# Patient Record
Sex: MALE | Race: BLACK OR AFRICAN AMERICAN | Employment: OTHER | URBAN - METROPOLITAN AREA
[De-identification: names, ages, dates, MRNs, and addresses within clinical notes are randomized per-mention and may not be internally consistent; named-entity substitution may affect disease eponyms.]

---

## 2017-01-10 ENCOUNTER — OFFICE VISIT (OUTPATIENT)
Dept: NEUROLOGY | Age: 77
End: 2017-01-10

## 2017-01-10 VITALS
BODY MASS INDEX: 24.21 KG/M2 | WEIGHT: 150 LBS | RESPIRATION RATE: 18 BRPM | DIASTOLIC BLOOD PRESSURE: 62 MMHG | HEART RATE: 98 BPM | SYSTOLIC BLOOD PRESSURE: 110 MMHG

## 2017-01-10 DIAGNOSIS — G30.1 LATE ONSET ALZHEIMER'S DISEASE WITHOUT BEHAVIORAL DISTURBANCE (HCC): Primary | ICD-10-CM

## 2017-01-10 DIAGNOSIS — I63.539 CEREBROVASCULAR ACCIDENT (CVA) DUE TO OCCLUSION OF POSTERIOR CEREBRAL ARTERY, UNSPECIFIED BLOOD VESSEL LATERALITY (HCC): ICD-10-CM

## 2017-01-10 DIAGNOSIS — F02.80 LATE ONSET ALZHEIMER'S DISEASE WITHOUT BEHAVIORAL DISTURBANCE (HCC): Primary | ICD-10-CM

## 2017-01-10 DIAGNOSIS — E53.8 B12 DEFICIENCY: ICD-10-CM

## 2017-01-10 RX ORDER — DONEPEZIL HYDROCHLORIDE 10 MG/1
10 TABLET, FILM COATED ORAL
Qty: 90 TAB | Refills: 1 | Status: SHIPPED | OUTPATIENT
Start: 2017-01-10 | End: 2017-07-11 | Stop reason: ALTCHOICE

## 2017-01-10 RX ORDER — ASPIRIN 81 MG/1
81 TABLET ORAL
COMMUNITY
End: 2021-05-16

## 2017-01-10 RX ORDER — ATORVASTATIN CALCIUM 40 MG/1
40 TABLET, FILM COATED ORAL DAILY
Qty: 90 TAB | Refills: 1 | Status: SHIPPED | OUTPATIENT
Start: 2017-01-10 | End: 2017-07-17 | Stop reason: SDUPTHER

## 2017-01-10 NOTE — LETTER
1/10/2017 1:56 PM 
 
Patient:  Rosie Shaffer YOB: 1940 Date of Visit: 1/10/2017 Dear Jin Huber NP 
[facility] [address] VIA Facsimile: [phone] 
 : Thank you for referring Mr. Rosie Shaffer to me for evaluation/treatment. Below are the relevant portions of my assessment and plan of care. If you have questions, please do not hesitate to call me. I look forward to following Mr. Jose Eduardo Goodwin along with you.  
 
 
 
Sincerely, 
 
 
Angeline Kehr, DO

## 2017-01-10 NOTE — PATIENT INSTRUCTIONS

## 2017-01-10 NOTE — MR AVS SNAPSHOT
Visit Information Date & Time Provider Department Dept. Phone Encounter #  
 1/10/2017  1:40 PM Dinesh Holloway, [address] [facility] at [address]  [id number] Follow-up Instructions Return in about 6 months (around 7/10/2017). Upcoming Health Maintenance Date Due DTaP/Tdap/Td series (1 - Tdap) 7/14/1962 ZOSTER VACCINE AGE 60> 7/14/2001 GLAUCOMA SCREENING Q2Y 7/14/2006 Pneumococcal 65+ Low/Medium Risk (1 of 2 - PCV13) 7/14/2006 MEDICARE YEARLY EXAM 7/14/2006 INFLUENZA AGE 9 TO ADULT 8/1/2016 Allergies as of 1/10/2017  Review Complete On: 1/10/2017 By: Dinesh Holloway, DO No Known Allergies Current Immunizations  Never Reviewed No immunizations on file. Not reviewed this visit You Were Diagnosed With   
  
 Codes Comments Late onset Alzheimer's disease without behavioral disturbance    -  Primary ICD-10-CM: G30.1, F02.80 ICD-9-CM: 331.0, 294.10   
 B12 deficiency     ICD-10-CM: E53.8 ICD-9-CM: 266.2 Cerebrovascular accident (CVA) due to occlusion of posterior cerebral artery, unspecified blood vessel laterality ([facility] [address]     ICD-10-CM: R07.136 ICD-9-CM: 434.91 Vitals BP Pulse Resp Weight(growth percentile) BMI Smoking Status 110/62 98 18 150 lb (68 kg) 24.21 kg/m2 Former Smoker BMI and BSA Data Body Mass Index Body Surface Area  
 24.21 kg/m 2 1.78 m 2 Preferred Pharmacy Pharmacy Name Phone [facility] [address] [place], [place] - [address] [place] [address] [phone] Your Updated Medication List  
  
   
This list is accurate as of: 1/10/17  1:53 PM.  Always use your most recent med list.  
  
  
  
  
 aspirin delayed-release 81 mg tablet Take  by mouth daily. atorvastatin 40 mg tablet Commonly known as:  LIPITOR Take 1 Tab by mouth daily. donepezil 10 mg tablet Commonly known as:  ARICEPT Take 1 Tab by mouth nightly. VITAMIN B-12 1,000 mcg tablet Generic drug:  cyanocobalamin Take 1,000 mcg by mouth daily. Prescriptions Sent to Pharmacy Refills  
 atorvastatin (LIPITOR) 40 mg tablet 1 Sig: Take 1 Tab by mouth daily. Class: Normal  
 Pharmacy: [address] [place] [address] Ph #: [phone] Route: Oral  
 donepezil (ARICEPT) 10 mg tablet 1 Sig: Take 1 Tab by mouth nightly. Class: Normal  
 Pharmacy: [address] [place] [address] Ph #: [phone] Route: Oral  
  
Follow-up Instructions Return in about 6 months (around 7/10/2017). Patient Instructions A Healthy Lifestyle: Care Instructions Your Care Instructions A healthy lifestyle can help you feel good, stay at a healthy weight, and have plenty of energy for both work and play. A healthy lifestyle is something you can share with your whole family. A healthy lifestyle also can lower your risk for serious health problems, such as high blood pressure, heart disease, and diabetes. You can follow a few steps listed below to improve your health and the health of your family. Follow-up care is a key part of your treatment and safety. Be sure to make and go to all appointments, and call your doctor if you are having problems. Its also a good idea to know your test results and keep a list of the medicines you take. How can you care for yourself at home? · Do not eat too much sugar, fat, or fast foods. You can still have dessert and treats now and then. The goal is moderation. · Start small to improve your eating habits. Pay attention to portion sizes, drink less juice and soda pop, and eat more fruits and vegetables. ¨ Eat a healthy amount of food. A 3-ounce serving of meat, for example, is about the size of a deck of cards. Fill the rest of your plate with vegetables and whole grains. ¨ Limit the amount of soda and sports drinks you have every day. Drink more water when you are thirsty. ¨ Eat at least 5 servings of fruits and vegetables every day. It may seem like a lot, but it is not hard to reach this goal. A serving or helping is 1 piece of fruit, 1 cup of vegetables, or 2 cups of leafy, raw vegetables. Have an apple or some carrot sticks as an afternoon snack instead of a candy bar. Try to have fruits and/or vegetables at every meal. 
· Make exercise part of your daily routine. You may want to start with simple activities, such as walking, bicycling, or slow swimming. Try to be active 30 to 60 minutes every day. You do not need to do all 30 to 60 minutes all at once. For example, you can exercise 3 times a day for 10 or 20 minutes. Moderate exercise is safe for most people, but it is always a good idea to talk to your doctor before starting an exercise program. 
· Keep moving. Kolbeatriz Mais the lawn, work in the garden, or Think Passenger. Take the stairs instead of the elevator at work. · If you smoke, quit. People who smoke have an increased risk for heart attack, stroke, cancer, and other lung illnesses. Quitting is hard, but there are ways to boost your chance of quitting tobacco for good. ¨ Use nicotine gum, patches, or lozenges. ¨ Ask your doctor about stop-smoking programs and medicines. ¨ Keep trying. In addition to reducing your risk of diseases in the future, you will notice some benefits soon after you stop using tobacco. If you have shortness of breath or asthma symptoms, they will likely get better within a few weeks after you quit. · Limit how much alcohol you drink. Moderate amounts of alcohol (up to 2 drinks a day for men, 1 drink a day for women) are okay. But drinking too much can lead to liver problems, high blood pressure, and other health problems. Family health If you have a family, there are many things you can do together to improve your health. · Eat meals together as a family as often as possible. · Eat healthy foods. This includes fruits, vegetables, lean meats and dairy, and whole grains. · Include your family in your fitness plan. Most people think of activities such as jogging or tennis as the way to fitness, but there are many ways you and your family can be more active. Anything that makes you breathe hard and gets your heart pumping is exercise. Here are some tips: 
¨ Walk to do errands or to take your child to school or the bus. ¨ Go for a family bike ride after dinner instead of watching TV. Where can you learn more? Go to http://jacintoRockwell Collinsray.info/. Enter F069 in the search box to learn more about \"A Healthy Lifestyle: Care Instructions. \" Current as of: July 26, 2016 Content Version: 11.1 © 1995-0889 [employer]. Care instructions adapted under license by [employer] (which disclaims liability or warranty for this information). If you have questions about a medical condition or this instruction, always ask your healthcare professional. [address] any warranty or liability for your use of this information. Please provide this summary of care documentation to your next provider. Your primary care clinician is listed as Krysta Branch. If you have any questions after today's visit, please call [phone].

## 2017-01-10 NOTE — PROGRESS NOTES
Neurology Clinic Follow up Note    Patient ID:  Ritika Colon  [id number]  77 y.o.  1940      Mr. Ashutosh Sargent is here for follow up today of  Chief Complaint   Patient presents with    Memory Loss          Last Appointment With Me:  10/6/16      Interval History:   Pt and family report cognitive deficits have been stable since last visit. Questioning why he has to take medications at times. No behavioral outburst, agitation. Independent of all ADLs except medication administration. He is still driving and denies getting lost.  No MVAs. Still living at home with his wife. He is still sleeping well. Lost 3 lbs since last visit. Eating all meals appropriately. Denies side effects from Aricept. Taking B12 1000mcg daily due to deficiency noted on labs. Taking ASA/Lipitor for stroke prevention. PMHx/ PSHx/ FHx/ SHx:  Reviewed and unchanged previous visit. ROS:  Comprehensive review of systems negative except for as noted above. Objective:       Meds:  Current Outpatient Prescriptions   Medication Sig Dispense Refill    aspirin delayed-release 81 mg tablet Take  by mouth daily.  atorvastatin (LIPITOR) 40 mg tablet Take 1 Tab by mouth daily. 30 Tab 2    cyanocobalamin (VITAMIN B-12) 1,000 mcg tablet Take 1,000 mcg by mouth daily.  donepezil (ARICEPT) 10 mg tablet Take 1 Tab by mouth nightly. 30 Tab 2        Visit Vitals    /62    Pulse 98    Resp 18    Wt 68 kg (150 lb)    BMI 24.21 kg/m2       LABS  Results for orders placed or performed in visit on 10/06/16   LIPID PANEL   Result Value Ref Range    Cholesterol, total 182 100 - 199 mg/dL    Triglyceride 121 0 - 149 mg/dL    HDL Cholesterol 43 >39 mg/dL    VLDL, calculated 24 5 - 40 mg/dL    LDL, calculated 115 (H) 0 - 99 mg/dL       IMAGING:  MRI Results (most recent):    Results from Hospital Encounter encounter on 07/14/16   MRI BRAIN WO CONT   Narrative **Final Report**      ICD Codes / Adm. Diagnosis: 331.0 294.10 / Alzheimer's disease  Dementia in   conditions classif  Examination:  MR BRAIN WO CON  - [id number] - Jul 14 2016  6:09PM  Accession No:  [id number]  Reason:  dementia      REPORT:  EXAM:  MR BRAIN WO CON    INDICATION:  Progressing short-term memory loss over the past 8 months. Tobacco use. COMPARISON: None. TECHNIQUE: Sagittal T1; coronal T2; axial T1, T2, FLAIR, gradient-echo, and   diffusion weighted noncontrast MRI of the brain. FINDINGS: Moderate cerebral volume loss for age. No hydrocephalus. No mass   effect or midline shift. No extra-axial fluid collection. Volume loss and hyperintense T2 signal in the left occipital lobe indicates   old infarct. There is ex vacuo dilatation of the occipital horn of the left   lateral ventricle. Moderate volume of hyperintense T2/FLAIR signal in the cerebral white matter   is predominantly periventricular in the frontal and parietal lobes. No   restricted diffusion to indicate acute infarct. No blood flow in the left vertebral artery. Right vertebral artery and   basilar artery flow voids are patent. Anterior circulation flow-voids are   patent. Medial temporal lobes are symmetric. The midline structures, including the   cervicomedullary junction, are within normal limits. IMPRESSION:    1. Volume loss and chronic microvascular ischemic disease. 2. Old left occipital infarct. 3. No acute infarct or mass effect. 4. Evidence of left vertebral artery occlusion. Signing/Reading Doctor: Ailyn Kent ([id number])    Approved: Ailyn Kent ([id number])  Jul 15 2016  8:58AM                                          Assessment:     Encounter Diagnoses     ICD-10-CM ICD-9-CM   1. Late onset Alzheimer's disease without behavioral disturbance G30.1 331.0    F02.80 294.10     76 yo AAM presenting for f/u regarding slowly progressive cognitive impairment. [address] [place] [address] 14/30 c/w moderate dementia.  MRI Brain completed and demonstrates moderate burden of microvascular ischemia, remote L occipital infarct and mod to severe global atrophy prominent over the R>L temporal lobes. 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 In addition, laboratory investigations for underlying metabolic derangements revealed B12 deficiency. He appears to be responding well to Aricept. Plan:   Cont. ASA 81mg, Lipitor for stroke prevention  Cont. Aricept 10mg for moderate dementia  Cont. B12 supplementation  Heart healthy diet and exercise  Regular scheduled cognitive and social engagement.   F/U 6 months    Signed:  Daniel Hughes DO  1/10/2017

## 2017-07-11 ENCOUNTER — OFFICE VISIT (OUTPATIENT)
Dept: NEUROLOGY | Age: 77
End: 2017-07-11

## 2017-07-11 VITALS
DIASTOLIC BLOOD PRESSURE: 58 MMHG | BODY MASS INDEX: 22.27 KG/M2 | RESPIRATION RATE: 18 BRPM | WEIGHT: 138 LBS | HEART RATE: 69 BPM | OXYGEN SATURATION: 98 % | SYSTOLIC BLOOD PRESSURE: 128 MMHG

## 2017-07-11 DIAGNOSIS — I63.9 CEREBROVASCULAR ACCIDENT (CVA), UNSPECIFIED MECHANISM (HCC): ICD-10-CM

## 2017-07-11 DIAGNOSIS — G30.1 LATE ONSET ALZHEIMER'S DISEASE WITHOUT BEHAVIORAL DISTURBANCE (HCC): Primary | ICD-10-CM

## 2017-07-11 DIAGNOSIS — F02.80 LATE ONSET ALZHEIMER'S DISEASE WITHOUT BEHAVIORAL DISTURBANCE (HCC): Primary | ICD-10-CM

## 2017-07-11 DIAGNOSIS — E53.8 B12 DEFICIENCY: ICD-10-CM

## 2017-07-11 NOTE — LETTER
7/11/2017 3:56 PM 
 
Patient:  Jasper Rizo YOB: 1940 Date of Visit: 7/11/2017 Dear Corrinne Haus, SOBEIDA 
[address] [address] [address] VIA Facsimile: [phone] 
 : 
 
 
I recently had the pleasure of seeing  Mr. Jasper Rizo  for evaluation/treatment. Below are the relevant portions of my assessment and plan of care. If you have questions, please do not hesitate to call me. I look forward to following Mr. Berry Lanes along with you.  
 
 
 
Sincerely, 
 
 
Alex Breauxh, DO

## 2017-07-11 NOTE — PROGRESS NOTES
Neurology Clinic Follow up Note    Patient ID:  Adrianna Jarrell  [id number]  77 y.o.  1940      Mr. Lin Culp is here for follow up today of dementia       Last Appointment With Me:  1/10/17      Interval History:   Pt and family report cognitive deficits have progressed since last visit. More repetition at times per his daughter. He is uncooperative per his wife and quickly agitated. Independent of all ADLs except medication administration. He is still driving and denies getting lost.  No MVAs. Still living at home with his wife. He is still sleeping well. Skipping lunch daily and continuing to lose weight. Denies side effects from Aricept. Taking B12 1000mcg daily due to deficiency noted on labs. Taking ASA/Lipitor for stroke prevention. PMHx/ PSHx/ FHx/ SHx:  Reviewed and unchanged previous visit. ROS:  Comprehensive review of systems negative except for as noted above. Objective:       Meds:  Current Outpatient Prescriptions   Medication Sig Dispense Refill    aspirin delayed-release 81 mg tablet Take  by mouth daily.  atorvastatin (LIPITOR) 40 mg tablet Take 1 Tab by mouth daily. 90 Tab 1    donepezil (ARICEPT) 10 mg tablet Take 1 Tab by mouth nightly. 90 Tab 1    cyanocobalamin (VITAMIN B-12) 1,000 mcg tablet Take 1,000 mcg by mouth daily. Visit Vitals    /58    Pulse 69    Resp 18    Wt 62.6 kg (138 lb)    SpO2 98%    BMI 22.27 kg/m2       LABS  Results for orders placed or performed in visit on 10/06/16   LIPID PANEL   Result Value Ref Range    Cholesterol, total 182 100 - 199 mg/dL    Triglyceride 121 0 - 149 mg/dL    HDL Cholesterol 43 >39 mg/dL    VLDL, calculated 24 5 - 40 mg/dL    LDL, calculated 115 (H) 0 - 99 mg/dL       IMAGING:  MRI Results (most recent):    Results from Hospital Encounter encounter on 07/14/16   MRI BRAIN WO CONT   Narrative **Final Report**      ICD Codes / Adm. Diagnosis: 331.0  294.10 / Alzheimer's disease  Dementia in   conditions classif  Examination:  MR BRAIN WO CON  - [id number] - Jul 14 2016  6:09PM  Accession No:  [id number]  Reason:  dementia      REPORT:  EXAM:  MR BRAIN WO CON    INDICATION:  Progressing short-term memory loss over the past 8 months. Tobacco use. COMPARISON: None. TECHNIQUE: Sagittal T1; coronal T2; axial T1, T2, FLAIR, gradient-echo, and   diffusion weighted noncontrast MRI of the brain. FINDINGS: Moderate cerebral volume loss for age. No hydrocephalus. No mass   effect or midline shift. No extra-axial fluid collection. Volume loss and hyperintense T2 signal in the left occipital lobe indicates   old infarct. There is ex vacuo dilatation of the occipital horn of the left   lateral ventricle. Moderate volume of hyperintense T2/FLAIR signal in the cerebral white matter   is predominantly periventricular in the frontal and parietal lobes. No   restricted diffusion to indicate acute infarct. No blood flow in the left vertebral artery. Right vertebral artery and   basilar artery flow voids are patent. Anterior circulation flow-voids are   patent. Medial temporal lobes are symmetric. The midline structures, including the   cervicomedullary junction, are within normal limits. IMPRESSION:    1. Volume loss and chronic microvascular ischemic disease. 2. Old left occipital infarct. 3. No acute infarct or mass effect. 4. Evidence of left vertebral artery occlusion. Signing/Reading Doctor: Lia Carson ([id number])    Approved: Lia Carson ([id number])  Jul 15 2016  8:58AM                                          Assessment:     Encounter Diagnoses     ICD-10-CM ICD-9-CM   1. Late onset Alzheimer's disease without behavioral disturbance G30.1 331.0    F02.80 294.10   2. B12 deficiency E53.8 266.2   3. Cerebrovascular accident (CVA), unspecified mechanism ([facility] [address] I63.9 434.91     78 yo AAM presenting for f/u regarding slowly progressive cognitive impairment.   [address] [place] [address] 14/30 c/w moderate dementia. MRI Brain completed and demonstrates moderate burden of microvascular ischemia, remote L occipital infarct and mod to severe global atrophy prominent over the R>L temporal lobes. 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 In addition, laboratory investigations for underlying metabolic derangements revealed B12 deficiency. Family reporting some slow decline in cognition and mild agitation at times. Discussed trial of Namzaric (combination Aricept/namenda) which may further assist with his cognitive deficits. He continues to lose weight- encouraged meals TID and supplementation with Ensure to increase caloric intake. Plan:   Cont. ASA 81mg, Lipitor for stroke prevention  D/C Aricept- start Namzaric titration pack   Cont. B12 supplementation  Heart healthy diet and exercise  Regular scheduled cognitive and social engagement.   F/U 6 months    Signed:  Lenin oRgers DO  7/11/2017

## 2017-07-11 NOTE — MR AVS SNAPSHOT
Visit Information Date & Time Provider Department Dept. Phone Encounter #  
 7/11/2017  2:40 PM Georgi Jain, [facility] at [address] [id number] Follow-up Instructions Return in about 6 months (around 1/11/2018). Upcoming Health Maintenance Date Due DTaP/Tdap/Td series (1 - Tdap) 7/14/1962 ZOSTER VACCINE AGE 60> 7/14/2001 GLAUCOMA SCREENING Q2Y 7/14/2006 Pneumococcal 65+ Low/Medium Risk (1 of 2 - PCV13) 7/14/2006 MEDICARE YEARLY EXAM 7/14/2006 INFLUENZA AGE 9 TO ADULT 8/1/2017 Allergies as of 7/11/2017  Review Complete On: 7/11/2017 By: Georgi Jain, DO No Known Allergies Current Immunizations  Never Reviewed No immunizations on file. Not reviewed this visit You Were Diagnosed With   
  
 Codes Comments Late onset Alzheimer's disease without behavioral disturbance    -  Primary ICD-10-CM: G30.1, F02.80 ICD-9-CM: 331.0, 294.10   
 B12 deficiency     ICD-10-CM: E53.8 ICD-9-CM: 266.2 Cerebrovascular accident (CVA), unspecified mechanism ([facility] [address]     ICD-10-CM: I63.9 ICD-9-CM: 434.91 Vitals BP Pulse Resp Weight(growth percentile) SpO2 BMI  
 128/58 69 18 138 lb (62.6 kg) 98% 22.27 kg/m2 Smoking Status Former Smoker Vitals History BMI and BSA Data Body Mass Index Body Surface Area  
 22.27 kg/m 2 1.71 m 2 Preferred Pharmacy Pharmacy Name Phone [facility] [address] [place], [place] - [address] [place] [address] [phone] Your Updated Medication List  
  
   
This list is accurate as of: 7/11/17  3:12 PM.  Always use your most recent med list.  
  
  
  
  
 aspirin delayed-release 81 mg tablet Take  by mouth daily. atorvastatin 40 mg tablet Commonly known as:  LIPITOR Take 1 Tab by mouth daily. * memantine-donepezil 28-10 mg Cspx Commonly known as:  [employer] [facility] Take 28 mg by mouth daily. * memantine-donepezil 7/14/21/28 mg-10 mg C24k Commonly known as:  [facility] Take 7 mg by mouth daily. VITAMIN B-12 1,000 mcg tablet Generic drug:  cyanocobalamin Take 1,000 mcg by mouth daily. * Notice: This list has 2 medication(s) that are the same as other medications prescribed for you. Read the directions carefully, and ask your doctor or other care provider to review them with you. Prescriptions Sent to Pharmacy Refills  
 memantine-donepezil (NAMZARIC) 28-10 mg CSpX 2 Sig: Take 28 mg by mouth daily. Class: Normal  
 Pharmacy: [address] [place] [address] Road [address] [place] [address], [address] [place] [address] Ph #: [phone] Route: Oral  
  
Follow-up Instructions Return in about 6 months (around 1/11/2018). Introducing [facility] SERVICES! Modesto Zhang introduces [employer] patient portal. Now you can access parts of your medical record, email your doctor's office, and request medication refills online. 1. In your internet browser, go to [email or web address] 2. Click on the First Time User? Click Here link in the Sign In box. You will see the New Member Sign Up page. 3. Enter your [employer] Access Code exactly as it appears below. You will not need to use this code after youve completed the sign-up process. If you do not sign up before the expiration date, you must request a new code. · [employer] Access Code: [email or web address] Expires: 10/9/2017  2:04 PM 
 
4. Enter the last four digits of your Social Security Number (xxxx) and Date of Birth (mm/dd/yyyy) as indicated and click Submit. You will be taken to the next sign-up page. 5. Create a [employer] ID. This will be your [employer] login ID and cannot be changed, so think of one that is secure and easy to remember. 6. Create a [employer] password. You can change your password at any time. 7. Enter your Password Reset Question and Answer. This can be used at a later time if you forget your password. 8. Enter your e-mail address. You will receive e-mail notification when new information is available in [address] 9. Click Sign Up. You can now view and download portions of your medical record. 10. Click the Download Summary menu link to download a portable copy of your medical information. If you have questions, please visit the Frequently Asked Questions section of the [employer] website. Remember, [employer] is NOT to be used for urgent needs. For medical emergencies, dial 911. Now available from your iPhone and Android! Please provide this summary of care documentation to your next provider. Your primary care clinician is listed as Krysta Garcia. If you have any questions after today's visit, please call [phone].

## 2017-08-02 ENCOUNTER — TELEPHONE (OUTPATIENT)
Dept: NEUROLOGY | Age: 77
End: 2017-08-02

## 2017-08-02 NOTE — TELEPHONE ENCOUNTER
Pt was prescribed new medication at his last visit. Was unable to do the medication because of the cost, wondering if he can go back to the old medication and would also need a refill. Requesting a phone call from the nurse to clarify.

## 2017-08-03 RX ORDER — MEMANTINE HYDROCHLORIDE 5 MG/1
5 TABLET ORAL DAILY
Qty: 30 TAB | Refills: 2 | Status: SHIPPED | OUTPATIENT
Start: 2017-08-03 | End: 2017-11-01 | Stop reason: SDUPTHER

## 2017-08-03 RX ORDER — DONEPEZIL HYDROCHLORIDE 10 MG/1
10 TABLET, FILM COATED ORAL
Qty: 30 TAB | Refills: 2 | Status: SHIPPED | OUTPATIENT
Start: 2017-08-03 | End: 2017-11-07 | Stop reason: SDUPTHER

## 2017-08-03 NOTE — TELEPHONE ENCOUNTER
Spoke with daughter, informed her of new orders for Aricept and Namenda per  verbal order. She verbalized understanding.

## 2018-01-18 RX ORDER — ATORVASTATIN CALCIUM 40 MG/1
TABLET, FILM COATED ORAL
Qty: 90 TAB | Refills: 0 | Status: SHIPPED | OUTPATIENT
Start: 2018-01-18 | End: 2018-04-16 | Stop reason: SDUPTHER

## 2018-01-29 ENCOUNTER — OFFICE VISIT (OUTPATIENT)
Dept: NEUROLOGY | Age: 78
End: 2018-01-29

## 2018-01-29 VITALS — DIASTOLIC BLOOD PRESSURE: 62 MMHG | SYSTOLIC BLOOD PRESSURE: 134 MMHG | WEIGHT: 135 LBS | BODY MASS INDEX: 21.79 KG/M2

## 2018-01-29 DIAGNOSIS — G30.1 LATE ONSET ALZHEIMER'S DISEASE WITHOUT BEHAVIORAL DISTURBANCE (HCC): Primary | ICD-10-CM

## 2018-01-29 DIAGNOSIS — F02.80 LATE ONSET ALZHEIMER'S DISEASE WITHOUT BEHAVIORAL DISTURBANCE (HCC): Primary | ICD-10-CM

## 2018-01-29 DIAGNOSIS — Z86.73 REMOTE HISTORY OF STROKE: ICD-10-CM

## 2018-01-29 DIAGNOSIS — E53.8 B12 DEFICIENCY: ICD-10-CM

## 2018-01-29 RX ORDER — MEMANTINE HYDROCHLORIDE 10 MG/1
10 TABLET ORAL DAILY
Qty: 90 TAB | Refills: 1 | Status: SHIPPED | OUTPATIENT
Start: 2018-01-29 | End: 2018-05-09 | Stop reason: SDUPTHER

## 2018-01-29 RX ORDER — DONEPEZIL HYDROCHLORIDE 10 MG/1
10 TABLET, FILM COATED ORAL
Qty: 90 TAB | Refills: 1 | Status: SHIPPED | OUTPATIENT
Start: 2018-01-29 | End: 2018-05-09

## 2018-01-29 NOTE — MR AVS SNAPSHOT
[address] 
[phone] Patient: Karon Guerrero MRN: [id number] Visit Information Date & Time Provider Department Dept. Phone Encounter #  
 1/29/2018  2:40 PM Irma Correia, [address] [facility] [place] [id number] Follow-up Instructions Return in about 6 months (around 7/29/2018). Upcoming Health Maintenance Date Due DTaP/Tdap/Td series (1 - Tdap) 7/14/1962 ZOSTER VACCINE AGE 60> 5/14/2001 GLAUCOMA SCREENING Q2Y 7/14/2006 Pneumococcal 65+ Low/Medium Risk (1 of 2 - PCV13) 7/14/2006 MEDICARE YEARLY EXAM 7/14/2006 Influenza Age 5 to Adult 8/1/2017 Allergies as of 1/29/2018  Review Complete On: 1/29/2018 By: Irma Correia, DO No Known Allergies Current Immunizations  Never Reviewed No immunizations on file. Not reviewed this visit You Were Diagnosed With   
  
 Codes Comments Late onset Alzheimer's disease without behavioral disturbance    -  Primary ICD-10-CM: G30.1, F02.80 ICD-9-CM: 331.0, 294.10   
 B12 deficiency     ICD-10-CM: E53.8 ICD-9-CM: 266.2 Remote history of stroke     ICD-10-CM: Z86.73 
ICD-9-CM: V12.54 Vitals BP Weight(growth percentile) BMI Smoking Status 134/62 135 lb (61.2 kg) 21.79 kg/m2 Former Smoker BMI and BSA Data Body Mass Index Body Surface Area 21.79 kg/m 2 1.69 m 2 Preferred Pharmacy Pharmacy Name Phone [facility] [address] [place] [address] Osbaldo Gary [phone] Your Updated Medication List  
  
   
This list is accurate as of: 1/29/18  3:01 PM.  Always use your most recent med list.  
  
  
  
  
 aspirin delayed-release 81 mg tablet Take  by mouth daily. atorvastatin 40 mg tablet Commonly known as:  LIPITOR  
TAKE ONE TABLET BY MOUTH ONCE DAILY * donepezil 10 mg tablet Commonly known as:  ARICEPT Take 1 Tab by mouth nightly. * donepezil 10 mg tablet Commonly known as:  ARICEPT Take 1 Tab by mouth nightly. memantine 10 mg tablet Commonly known as:  Kai Landaverde Take 1 Tab by mouth daily. * memantine-donepezil 28-10 mg Cspx Commonly known as:  [facility] Take 28 mg by mouth daily. * memantine-donepezil 7/14/21/28 mg-10 mg C24k Commonly known as:  [facility] Take 7 mg by mouth daily. VITAMIN B-12 1,000 mcg tablet Generic drug:  cyanocobalamin Take 1,000 mcg by mouth daily. * Notice: This list has 4 medication(s) that are the same as other medications prescribed for you. Read the directions carefully, and ask your doctor or other care provider to review them with you. Prescriptions Sent to Pharmacy Refills  
 memantine (NAMENDA) 10 mg tablet 1 Sig: Take 1 Tab by mouth daily. Class: Normal  
 [facility] [address] [place] [address] Ph #: [phone] Route: Oral  
 donepezil (ARICEPT) 10 mg tablet 1 Sig: Take 1 Tab by mouth nightly. Class: Normal  
 Pharmacy: [facility] JOSIE CONTRERAS [address] [place] [address] Ph #: [phone] Route: Oral  
  
Follow-up Instructions Return in about 6 months (around 7/29/2018). Patient Instructions A Healthy Lifestyle: Care Instructions Your Care Instructions A healthy lifestyle can help you feel good, stay at a healthy weight, and have plenty of energy for both work and play. A healthy lifestyle is something you can share with your whole family. A healthy lifestyle also can lower your risk for serious health problems, such as high blood pressure, heart disease, and diabetes. You can follow a few steps listed below to improve your health and the health of your family. Follow-up care is a key part of your treatment and safety.  Be sure to make and go to all appointments, and call your doctor if you are having problems. It's also a good idea to know your test results and keep a list of the medicines you take. How can you care for yourself at home? · Do not eat too much sugar, fat, or fast foods. You can still have dessert and treats now and then. The goal is moderation. · Start small to improve your eating habits. Pay attention to portion sizes, drink less juice and soda pop, and eat more fruits and vegetables. ¨ Eat a healthy amount of food. A 3-ounce serving of meat, for example, is about the size of a deck of cards. Fill the rest of your plate with vegetables and whole grains. ¨ Limit the amount of soda and sports drinks you have every day. Drink more water when you are thirsty. ¨ Eat at least 5 servings of fruits and vegetables every day. It may seem like a lot, but it is not hard to reach this goal. A serving or helping is 1 piece of fruit, 1 cup of vegetables, or 2 cups of leafy, raw vegetables. Have an apple or some carrot sticks as an afternoon snack instead of a candy bar. Try to have fruits and/or vegetables at every meal. 
· Make exercise part of your daily routine. You may want to start with simple activities, such as walking, bicycling, or slow swimming. Try to be active 30 to 60 minutes every day. You do not need to do all 30 to 60 minutes all at once. For example, you can exercise 3 times a day for 10 or 20 minutes. Moderate exercise is safe for most people, but it is always a good idea to talk to your doctor before starting an exercise program. 
· Keep moving. Jackelin Harpin the lawn, work in the garden, or Solaborate. Take the stairs instead of the elevator at work. · If you smoke, quit. People who smoke have an increased risk for heart attack, stroke, cancer, and other lung illnesses. Quitting is hard, but there are ways to boost your chance of quitting tobacco for good. ¨ Use nicotine gum, patches, or lozenges. ¨ Ask your doctor about stop-smoking programs and medicines. ¨ Keep trying. In addition to reducing your risk of diseases in the future, you will notice some benefits soon after you stop using tobacco. If you have shortness of breath or asthma symptoms, they will likely get better within a few weeks after you quit. · Limit how much alcohol you drink. Moderate amounts of alcohol (up to 2 drinks a day for men, 1 drink a day for women) are okay. But drinking too much can lead to liver problems, high blood pressure, and other health problems. Family health If you have a family, there are many things you can do together to improve your health. · Eat meals together as a family as often as possible. · Eat healthy foods. This includes fruits, vegetables, lean meats and dairy, and whole grains. · Include your family in your fitness plan. Most people think of activities such as jogging or tennis as the way to fitness, but there are many ways you and your family can be more active. Anything that makes you breathe hard and gets your heart pumping is exercise. Here are some tips: 
¨ Walk to do errands or to take your child to school or the bus. ¨ Go for a family bike ride after dinner instead of watching TV. Where can you learn more? Go to http://jacinto-ray.info/. Enter Q496 in the search box to learn more about \"A Healthy Lifestyle: Care Instructions. \" Current as of: May 12, 2017 Content Version: 11.4 © 5437-3884 [employer]. Care instructions adapted under license by [employer] (which disclaims liability or warranty for this information). If you have questions about a medical condition or this instruction, always ask your healthcare professional. [address] any warranty or liability for your use of this information. Introducing [facility]!    
 Sam Julien introduces [employer] patient portal. Now you can access parts of your medical record, email your doctor's office, and request medication refills online. 1. In your internet browser, go to [email or web address] "[email or web address]/[email or web address] 2. Click on the First Time User? Click Here link in the Sign In box. You will see the New Member Sign Up page. 3. Enter your Unight Access Code exactly as it appears below. You will not need to use this code after youve completed the sign-up process. If you do not sign up before the expiration date, you must request a new code. · Unight Access Code: [email or web address] Expires: 4/29/2018  2:33 PM 
 
4. Enter the last four digits of your Social Security Number (xxxx) and Date of Birth (mm/dd/yyyy) as indicated and click Submit. You will be taken to the next sign-up page. 5. Create a Unight ID. This will be your Unight login ID and cannot be changed, so think of one that is secure and easy to remember. 6. Create a Unight password. You can change your password at any time. 7. Enter your Password Reset Question and Answer. This can be used at a later time if you forget your password. 8. Enter your e-mail address. You will receive e-mail notification when new information is available in [address] 9. Click Sign Up. You can now view and download portions of your medical record. 10. Click the Download Summary menu link to download a portable copy of your medical information. If you have questions, please visit the Frequently Asked Questions section of the [employer]ight website. Remember, Unight is NOT to be used for urgent needs. For medical emergencies, dial 911. Now available from your iPhone and Android! Please provide this summary of care documentation to your next provider. Your primary care clinician is listed as Viola Adam. If you have any questions after today's visit, please call [phone].

## 2018-01-29 NOTE — PATIENT INSTRUCTIONS

## 2018-01-29 NOTE — PROGRESS NOTES
Neurology Clinic Follow up Note    Patient ID:  Mortimer Parma  [id number]  6 y.o.  1940      Mr. Kanu Marc is here for follow up today of dementia       Last Appointment With Me:  7/11/17      Interval History:   Cognitive deficits have progressed since last visit. More agitated/upset at times. Non-cooperative on occasion. Independent of all ADLs except medication administration. He is still driving-No MVAs but new difficulty with navigation since last visit. Still living at home with his wife. He is still sleeping well. Skipping lunch daily and continuing to lose weight. Refusing supplements. Denies side effects from Aricept/Namenda. Not able to afford Namzaric. Taking B12 1000mcg daily due to deficiency noted on labs. Taking ASA/Lipitor for stroke prevention. No new stroke s/s. PMHx/ PSHx/ FHx/ SHx:  Reviewed and unchanged previous visit. Past Medical History:   Diagnosis Date    Memory loss     Poor appetite          ROS:  Comprehensive review of systems negative except for as noted above. Objective:       Meds:  Current Outpatient Prescriptions   Medication Sig Dispense Refill    atorvastatin (LIPITOR) 40 mg tablet TAKE ONE TABLET BY MOUTH ONCE DAILY 90 Tab 0    memantine (NAMENDA) 5 mg tablet TAKE ONE TABLET BY MOUTH ONCE DAILY 30 Tab 2    donepezil (ARICEPT) 10 mg tablet Take 1 Tab by mouth nightly. 30 Tab 2    memantine (NAMENDA) 5 mg tablet Take 1 Tab by mouth daily. 90 Tab 0    donepezil (ARICEPT) 10 mg tablet Take 1 Tab by mouth nightly. 90 Tab 0    memantine-donepezil (NAMZARIC) 28-10 mg CSpX Take 28 mg by mouth daily. 30 Cap 2    memantine-donepezil \[facility]\"") 7/14/21/28 mg-10 mg C24k Take 7 mg by mouth daily. 28 Cap 0    aspirin delayed-release 81 mg tablet Take  by mouth daily.  cyanocobalamin (VITAMIN B-12) 1,000 mcg tablet Take 1,000 mcg by mouth daily.           Visit Vitals    /62    Wt 61.2 kg (135 lb)    BMI 21.79 kg/m2   NEUROLOGICAL EXAM:  General: Awake, alert, speech fluent  CN: PERRL, EOMI without nystagmus, VFF to confrontation, facial sensation and strength are normal and symmetric, hearing is intact to finger rub bilaterally, palate and tongue movements are intact and symmetric. Motor: Normal tone, bulk and strength bilaterally. Reflexes: 1/4 and symmetric, plantar stimulation is flexor. Coordination: FNF, HEATHER, HTS intact. Sensation: LT intact throughout. Gait: Normal based      LABS  Results for orders placed or performed in visit on 10/06/16   LIPID PANEL   Result Value Ref Range    Cholesterol, total 182 100 - 199 mg/dL    Triglyceride 121 0 - 149 mg/dL    HDL Cholesterol 43 >39 mg/dL    VLDL, calculated 24 5 - 40 mg/dL    LDL, calculated 115 (H) 0 - 99 mg/dL       IMAGING:  MRI Results (most recent):    Results from Hospital Encounter encounter on 07/14/16   MRI BRAIN WO CONT   Narrative **Final Report**      ICD Codes / Adm. Diagnosis: 331.0  294.10 / Alzheimer's disease  Dementia in   conditions classif  Examination:  MR BRAIN WO CON  - [id number] - Jul 14 2016  6:09PM  Accession No:  [id number]  Reason:  dementia      REPORT:  EXAM:  MR BRAIN WO CON    INDICATION:  Progressing short-term memory loss over the past 8 months. Tobacco use. COMPARISON: None. TECHNIQUE: Sagittal T1; coronal T2; axial T1, T2, FLAIR, gradient-echo, and   diffusion weighted noncontrast MRI of the brain. FINDINGS: Moderate cerebral volume loss for age. No hydrocephalus. No mass   effect or midline shift. No extra-axial fluid collection. Volume loss and hyperintense T2 signal in the left occipital lobe indicates   old infarct. There is ex vacuo dilatation of the occipital horn of the left   lateral ventricle. Moderate volume of hyperintense T2/FLAIR signal in the cerebral white matter   is predominantly periventricular in the frontal and parietal lobes. No   restricted diffusion to indicate acute infarct.      No blood flow in the left vertebral artery. Right vertebral artery and   basilar artery flow voids are patent. Anterior circulation flow-voids are   patent. Medial temporal lobes are symmetric. The midline structures, including the   cervicomedullary junction, are within normal limits. IMPRESSION:    1. Volume loss and chronic microvascular ischemic disease. 2. Old left occipital infarct. 3. No acute infarct or mass effect. 4. Evidence of left vertebral artery occlusion. Signing/Reading Doctor: Jaret Lerma ([id number])    Approved: Jaret Lerma ([id number])  Jul 15 2016  8:58AM                                          Assessment:     Encounter Diagnoses     ICD-10-CM ICD-9-CM   1. Late onset Alzheimer's disease without behavioral disturbance G30.1 331.0    F02.80 294.10   2. B12 deficiency E53.8 266.2   3. Remote history of stroke Z80.78 V16.53     68 yo AAM presenting for f/u regarding slowly progressive cognitive impairment. [facility] 14/30 c/w moderate dementia. MRI Brain completed and demonstrates moderate burden of microvascular ischemia, remote L occipital infarct and mod to severe global atrophy prominent over the R>L temporal lobes. 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 In addition, laboratory investigations for underlying metabolic derangements revealed B12 deficiency. Family reporting ongoing slow decline in cognition and mild agitation at times. Discussed trial of Namzaric (combination Aricept/namenda) which may further assist with his cognitive deficits, however this was too expensive. Will titrate Namenda and continue Aricept at current dosing. Weight loss is a continued concern. He is refusing meal supplements. We discussed obtaining a home health aid to ensure he is eating appropriately in the daytime when his wife is working. The family will look into how to best handle this. Also discussed cessation of driving given difficulties with navigation and concerns for safety.     Plan: Continue Aricept 10mg/d, increase Namenda to 10mg/d  Cont. ASA 81mg, Lipitor for stroke prevention  Cont. B12 supplementation  Advised to stop driving  Recommend adult day care vs home health aid to assist in the AM when wife is unavailable  Heart healthy diet and exercise  Regular scheduled cognitive and social engagement. F/U 6 months    Signed:  Wojciech Chanel, DO  1/29/2018    The duration of this appointment visit was 25 minutes of face-to-face time with the patient. At least 50% of this time was spent in counseling, explanation of diagnosis, planning of further management, and coordination of care.

## 2018-02-26 ENCOUNTER — TELEPHONE (OUTPATIENT)
Dept: NEUROLOGY | Age: 78
End: 2018-02-26

## 2018-02-26 DIAGNOSIS — R45.1 AGITATION: Primary | ICD-10-CM

## 2018-02-26 RX ORDER — LORAZEPAM 0.5 MG/1
TABLET ORAL
Qty: 30 TAB | Refills: 0 | Status: SHIPPED | OUTPATIENT
Start: 2018-02-26 | End: 2018-04-25 | Stop reason: SDUPTHER

## 2018-02-26 NOTE — TELEPHONE ENCOUNTER
Spoke with daughter, informed her  was okay with her reducing Namenda to 5mg daily for patient.  She wanted to know if there was anything to help calm him down, he's been very excitable with his increased confusion

## 2018-02-26 NOTE — TELEPHONE ENCOUNTER
Pt's daughter calling to know if the Jane Antis can be reduced back to 5mg because there has been an increase in confusion since the medication has been increased. . Please call back

## 2018-03-13 NOTE — TELEPHONE ENCOUNTER
Pt's wife calling because the pt has a headache above his right eye and she would like to know what he can take. Please call back.

## 2018-03-13 NOTE — TELEPHONE ENCOUNTER
Pt wife calling back, requesting a returned phone call to see which medication that pt has been prescribed can he take, please try both numbers.  [phone] cell or home [phone]

## 2018-03-14 NOTE — TELEPHONE ENCOUNTER
Spoke with wife, she said he had an acute headache yesterday but on the way to ED he said he felt better so they never went. She states she'll call back if it happens again.

## 2018-04-23 ENCOUNTER — TELEPHONE (OUTPATIENT)
Dept: NEUROLOGY | Age: 78
End: 2018-04-23

## 2018-04-23 NOTE — TELEPHONE ENCOUNTER
Pt's daughter calling because the pt is not sleeping and she is wondering if Dr. Tracy Bansal can prescribe him something to help hi sleep.  Please call back

## 2018-04-23 NOTE — TELEPHONE ENCOUNTER
Spoke with daughter, she states patient has been hallucinating and talking to people who have passed away. She has tried giving him the PRN Ativan but it doesn't help with sleep. She wants to know if anything else can be done.

## 2018-04-24 NOTE — TELEPHONE ENCOUNTER
Called Ms. Hannah Hernandez to inform her  would like to see patient in follow up. No answer/VM full.

## 2018-04-26 ENCOUNTER — TELEPHONE (OUTPATIENT)
Dept: NEUROLOGY | Age: 78
End: 2018-04-26

## 2018-04-26 NOTE — TELEPHONE ENCOUNTER
Pt's daughter calling because the pt needs a refill on one of his medication, she was unsure what the name of the medication was. She is wondering if he can get a refill or if he needs to come in for an appt.  Please call back

## 2018-05-01 ENCOUNTER — TELEPHONE (OUTPATIENT)
Dept: NEUROLOGY | Age: 78
End: 2018-05-01

## 2018-05-09 ENCOUNTER — OFFICE VISIT (OUTPATIENT)
Dept: NEUROLOGY | Age: 78
End: 2018-05-09

## 2018-05-09 VITALS
BODY MASS INDEX: 21.63 KG/M2 | DIASTOLIC BLOOD PRESSURE: 62 MMHG | RESPIRATION RATE: 18 BRPM | OXYGEN SATURATION: 98 % | WEIGHT: 134 LBS | SYSTOLIC BLOOD PRESSURE: 120 MMHG | HEART RATE: 75 BPM

## 2018-05-09 DIAGNOSIS — G30.1 LATE ONSET ALZHEIMER'S DISEASE WITH BEHAVIORAL DISTURBANCE (HCC): Primary | ICD-10-CM

## 2018-05-09 DIAGNOSIS — F02.818 LATE ONSET ALZHEIMER'S DISEASE WITH BEHAVIORAL DISTURBANCE (HCC): Primary | ICD-10-CM

## 2018-05-09 DIAGNOSIS — R45.1 AGITATION: ICD-10-CM

## 2018-05-09 DIAGNOSIS — Z86.73 REMOTE HISTORY OF STROKE: ICD-10-CM

## 2018-05-09 DIAGNOSIS — E53.8 B12 DEFICIENCY: ICD-10-CM

## 2018-05-09 RX ORDER — DONEPEZIL HYDROCHLORIDE 10 MG/1
10 TABLET, FILM COATED ORAL
Qty: 90 TAB | Refills: 1 | Status: SHIPPED | OUTPATIENT
Start: 2018-05-09 | End: 2019-01-24 | Stop reason: SDUPTHER

## 2018-05-09 RX ORDER — LORAZEPAM 0.5 MG/1
TABLET ORAL
Qty: 30 TAB | Refills: 4 | Status: SHIPPED | OUTPATIENT
Start: 2018-05-09 | End: 2021-05-16

## 2018-05-09 RX ORDER — MEMANTINE HYDROCHLORIDE 10 MG/1
10 TABLET ORAL DAILY
Qty: 90 TAB | Refills: 1 | Status: SHIPPED | OUTPATIENT
Start: 2018-05-09 | End: 2019-01-24 | Stop reason: SDUPTHER

## 2018-05-09 NOTE — PATIENT INSTRUCTIONS

## 2018-05-09 NOTE — PROGRESS NOTES
Neurology Clinic Follow up Note    Patient ID:  Masoud Jeong  [id number]  15 y.o.  1940      Mr. Binh French is here for follow up today of dementia       Last Appointment With Me:  1/29/18      Interval History:   Cognitive deficits have progressed since last visit. In the evenings, he is slightly more confused. Asking about children and other family members who are not there. He becomes upset often. No aggression, mild agitation. He is using low dose Ativan in the evenings PRN (using this most nights). Family is using this for agitation. He is not driving. Needs assistance with meal prep and medication administration. Otherwise independent for ADLs. He is sleeping ok. No falls. Pt is eating regularly. He is refusing supplements at times. Down 1 lb from last visit. Residing at home with his wife. Denies side effects from Aricept/Namenda at current dosing. Not able to afford Namzaric and did not tolerate higher dosing of Namenda prescribed at last visit (increasing confusion per family). Taking B12 1000mcg daily due to deficiency noted on labs. Taking ASA/Lipitor for stroke prevention. PMHx/ PSHx/ FHx/ SHx:  Reviewed and unchanged previous visit. Past Medical History:   Diagnosis Date    Memory loss     Poor appetite          ROS:  Comprehensive review of systems negative except for as noted above. Objective:       Meds:  Current Outpatient Prescriptions   Medication Sig Dispense Refill    LORazepam (ATIVAN) 0.5 mg tablet TAKE 1 TABLET BY MOUTH AS NEEDED FOR  AGITATION  . DO NOT EXCEED  DAILY  AMOUNT  0.5MG 30 Tab 0    atorvastatin (LIPITOR) 40 mg tablet TAKE ONE TABLET BY MOUTH ONCE DAILY 90 Tab 0    memantine (NAMENDA) 10 mg tablet Take 1 Tab by mouth daily. (Patient taking differently: Take 5 mg by mouth daily.) 90 Tab 1    donepezil (ARICEPT) 10 mg tablet Take 1 Tab by mouth nightly. 90 Tab 0    aspirin delayed-release 81 mg tablet Take  by mouth daily.       cyanocobalamin (VITAMIN B-12) 1,000 mcg tablet Take 1,000 mcg by mouth daily. Visit Vitals    /62    Pulse 75    Resp 18    Wt 60.8 kg (134 lb)    SpO2 98%    BMI 21.63 kg/m2      NEUROLOGICAL EXAM:  General: Awake, alert, speech fluent. \"June 6, 2017\", \"Lozano\", cannot recall POTUS. CN: PERRL, EOMI without nystagmus, VFF to confrontation, facial sensation and strength are normal and symmetric, hearing is intact to finger rub bilaterally, palate and tongue movements are intact and symmetric. Motor: Normal tone, bulk and strength bilaterally. Reflexes: 1/4 and symmetric, plantar stimulation is flexor. Coordination: FNF, HEATHER, HTS intact. Sensation: LT intact throughout. Gait: Normal based      LABS  Results for orders placed or performed in visit on 10/06/16   LIPID PANEL   Result Value Ref Range    Cholesterol, total 182 100 - 199 mg/dL    Triglyceride 121 0 - 149 mg/dL    HDL Cholesterol 43 >39 mg/dL    VLDL, calculated 24 5 - 40 mg/dL    LDL, calculated 115 (H) 0 - 99 mg/dL       IMAGING:  MRI Results (most recent):    Results from Hospital Encounter encounter on 07/14/16   MRI BRAIN WO CONT   Narrative **Final Report**      ICD Codes / Adm. Diagnosis: 331.0  294.10 / Alzheimer's disease  Dementia in   conditions classif  Examination:  MR BRAIN WO CON  - [id number] - Jul 14 2016  6:09PM  Accession No:  [id number]  Reason:  dementia      REPORT:  EXAM:  MR BRAIN WO CON    INDICATION:  Progressing short-term memory loss over the past 8 months. Tobacco use. COMPARISON: None. TECHNIQUE: Sagittal T1; coronal T2; axial T1, T2, FLAIR, gradient-echo, and   diffusion weighted noncontrast MRI of the brain. FINDINGS: Moderate cerebral volume loss for age. No hydrocephalus. No mass   effect or midline shift. No extra-axial fluid collection. Volume loss and hyperintense T2 signal in the left occipital lobe indicates   old infarct.  There is ex vacuo dilatation of the occipital horn of the left   lateral ventricle. Moderate volume of hyperintense T2/FLAIR signal in the cerebral white matter   is predominantly periventricular in the frontal and parietal lobes. No   restricted diffusion to indicate acute infarct. No blood flow in the left vertebral artery. Right vertebral artery and   basilar artery flow voids are patent. Anterior circulation flow-voids are   patent. Medial temporal lobes are symmetric. The midline structures, including the   cervicomedullary junction, are within normal limits. IMPRESSION:    1. Volume loss and chronic microvascular ischemic disease. 2. Old left occipital infarct. 3. No acute infarct or mass effect. 4. Evidence of left vertebral artery occlusion. Signing/Reading Doctor: Kit Urena ([id number])    Approved: Kit Urena ([id number])  Jul 15 2016  8:58AM                                          Assessment:     Encounter Diagnoses     ICD-10-CM ICD-9-CM   1. Late onset Alzheimer's disease with behavioral disturbance G30.1 331.0    F02.81 294.11   2. Remote history of stroke Z86.73 V12.54   3. B12 deficiency E53.8 266.2     68 yo AAM presenting for f/u regarding progressive cognitive impairment. [address] [place] [address] 14/30 c/w moderate dementia. MRI Brain completed and demonstrates moderate burden of microvascular ischemia, remote L occipital infarct and mod to severe global atrophy prominent over the R>L temporal lobes. 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 In addition, laboratory investigations for underlying metabolic derangements revealed B12 deficiency. Family reporting ongoing slow decline in cognition with severe recall deficits, disorientation and mild agitation at times. Agitation is being controlled with low dose Ativan. Discussed trial of Namzaric (combination Aricept/namenda) which may further assist with his cognitive deficits, however this was too expensive.   Attempted titration of Namenda, however family noted increasing confusion after dose titration. Discussed re-trial of Namenda at higher dosing- family will monitor for any acute worsening confusion. Advised that he continue to abstain from driving. Plan:   Continue Aricept 10mg/d, increase Namenda to 10mg/d (family will monitor for increasing confusion)  Cont. Ativan 0.5mg qhs for agitation  Cont. ASA 81mg, Lipitor for stroke prevention  Cont. B12 supplementation  Continue to abstain from driving  Heart healthy diet and exercise  Regular scheduled cognitive and social engagement.   F/U 6 months    Signed:  Adamaris Gamble DO  5/9/2018

## 2018-05-09 NOTE — MR AVS SNAPSHOT
[facility] [address] [place] [address] 
[phone] Patient: Ling Gunter MRN: [id number] AUW:[id number] Visit Information Date & Time Provider Department Dept. Phone Encounter #  
 5/9/2018  3:00 PM [facility] at [address] [id number] Follow-up Instructions Return in about 6 months (around 11/9/2018). Your Appointments 7/17/2018  2:40 PM  
Follow Up with Larry Quintana DO [facility] at [facility] Appt Note: 6 month f/u memory loss [address] [place] [id number]  
[phone]  
  
   
 [address]  [address] Upcoming Health Maintenance Date Due DTaP/Tdap/Td series (1 - Tdap) 7/14/1962 ZOSTER VACCINE AGE 60> 5/14/2001 GLAUCOMA SCREENING Q2Y 7/14/2006 Pneumococcal 65+ Low/Medium Risk (1 of 2 - PCV13) 7/14/2006 MEDICARE YEARLY EXAM 3/14/2018 Influenza Age 5 to Adult 8/1/2018 Allergies as of 5/9/2018  Review Complete On: 5/9/2018 By: Larry Quintana DO No Known Allergies Current Immunizations  Never Reviewed No immunizations on file. Not reviewed this visit You Were Diagnosed With   
  
 Codes Comments Late onset Alzheimer's disease with behavioral disturbance    -  Primary ICD-10-CM: G30.1, F02.81 ICD-9-CM: 331.0, 294.11 Remote history of stroke     ICD-10-CM: Z86.73 
ICD-9-CM: V12.54   
 B12 deficiency     ICD-10-CM: E53.8 ICD-9-CM: 266.2 Agitation     ICD-10-CM: R45.1 ICD-9-CM: 307.9 Vitals BP Pulse Resp Weight(growth percentile) SpO2 BMI  
 120/62 75 18 134 lb (60.8 kg) 98% 21.63 kg/m2 Smoking Status Former Smoker Vitals History BMI and BSA Data Body Mass Index Body Surface Area  
 21.63 kg/m 2 1.68 m 2 Preferred Pharmacy Pharmacy Name Phone [facility] [address] [facility] [phone] Your Updated Medication List  
  
   
This list is accurate as of 5/9/18  3:06 PM.  Always use your most recent med list.  
  
  
  
  
 aspirin delayed-release 81 mg tablet Take  by mouth daily. atorvastatin 40 mg tablet Commonly known as:  LIPITOR  
TAKE ONE TABLET BY MOUTH ONCE DAILY  
  
 donepezil 10 mg tablet Commonly known as:  ARICEPT Take 1 Tab by mouth nightly. LORazepam 0.5 mg tablet Commonly known as:  ATIVAN  
TAKE 1 TABLET BY MOUTH AS NEEDED FOR  AGITATION  . DO NOT EXCEED  DAILY  AMOUNT  0.5MG  
  
 memantine 10 mg tablet Commonly known as:  Иван Bicker Take 1 Tab by mouth daily. VITAMIN B-12 1,000 mcg tablet Generic drug:  cyanocobalamin Take 1,000 mcg by mouth daily. Prescriptions Printed Refills LORazepam (ATIVAN) 0.5 mg tablet 4 Sig: TAKE 1 TABLET BY MOUTH AS NEEDED FOR  AGITATION  . DO NOT EXCEED  DAILY  AMOUNT  0.5MG Class: Print Prescriptions Sent to Pharmacy Refills  
 donepezil (ARICEPT) 10 mg tablet 1 Sig: Take 1 Tab by mouth nightly. Class: Normal  
 Pharmacy: [address] [place] [address] Ph #: [phone] Route: Oral  
 memantine (NAMENDA) 10 mg tablet 1 Sig: Take 1 Tab by mouth daily. Class: Normal  
 Pharmacy: [address] [place] [address] Ph #: [phone] Route: Oral  
  
Follow-up Instructions Return in about 6 months (around 11/9/2018). Patient Instructions A Healthy Lifestyle: Care Instructions Your Care Instructions A healthy lifestyle can help you feel good, stay at a healthy weight, and have plenty of energy for both work and play. A healthy lifestyle is something you can share with your whole family.  
A healthy lifestyle also can lower your risk for serious health problems, such as high blood pressure, heart disease, and diabetes. You can follow a few steps listed below to improve your health and the health of your family. Follow-up care is a key part of your treatment and safety. Be sure to make and go to all appointments, and call your doctor if you are having problems. It's also a good idea to know your test results and keep a list of the medicines you take. How can you care for yourself at home? · Do not eat too much sugar, fat, or fast foods. You can still have dessert and treats now and then. The goal is moderation. · Start small to improve your eating habits. Pay attention to portion sizes, drink less juice and soda pop, and eat more fruits and vegetables. ¨ Eat a healthy amount of food. A 3-ounce serving of meat, for example, is about the size of a deck of cards. Fill the rest of your plate with vegetables and whole grains. ¨ Limit the amount of soda and sports drinks you have every day. Drink more water when you are thirsty. ¨ Eat at least 5 servings of fruits and vegetables every day. It may seem like a lot, but it is not hard to reach this goal. A serving or helping is 1 piece of fruit, 1 cup of vegetables, or 2 cups of leafy, raw vegetables. Have an apple or some carrot sticks as an afternoon snack instead of a candy bar. Try to have fruits and/or vegetables at every meal. 
· Make exercise part of your daily routine. You may want to start with simple activities, such as walking, bicycling, or slow swimming. Try to be active 30 to 60 minutes every day. You do not need to do all 30 to 60 minutes all at once. For example, you can exercise 3 times a day for 10 or 20 minutes. Moderate exercise is safe for most people, but it is always a good idea to talk to your doctor before starting an exercise program. 
· Keep moving. Pedro Em the lawn, work in the garden, or [employer]. Take the stairs instead of the elevator at work. · If you smoke, quit. People who smoke have an increased risk for heart attack, stroke, cancer, and other lung illnesses. Quitting is hard, but there are ways to boost your chance of quitting tobacco for good. ¨ Use nicotine gum, patches, or lozenges. ¨ Ask your doctor about stop-smoking programs and medicines. ¨ Keep trying. In addition to reducing your risk of diseases in the future, you will notice some benefits soon after you stop using tobacco. If you have shortness of breath or asthma symptoms, they will likely get better within a few weeks after you quit. · Limit how much alcohol you drink. Moderate amounts of alcohol (up to 2 drinks a day for men, 1 drink a day for women) are okay. But drinking too much can lead to liver problems, high blood pressure, and other health problems. Family health If you have a family, there are many things you can do together to improve your health. · Eat meals together as a family as often as possible. · Eat healthy foods. This includes fruits, vegetables, lean meats and dairy, and whole grains. · Include your family in your fitness plan. Most people think of activities such as jogging or tennis as the way to fitness, but there are many ways you and your family can be more active. Anything that makes you breathe hard and gets your heart pumping is exercise. Here are some tips: 
¨ Walk to do errands or to take your child to school or the bus. ¨ Go for a family bike ride after dinner instead of watching TV. Where can you learn more? Go to http://jacinto-ray.info/. Enter U419 in the search box to learn more about \"A Healthy Lifestyle: Care Instructions. \" Current as of: May 12, 2017 Content Version: 11.4 © 0096-7719 [employer]. Care instructions adapted under license by [employer] (which disclaims liability or warranty for this information).  If you have questions about a medical condition or this instruction, always ask your healthcare professional. [place] [address] any warranty or liability for your use of this information. Introducing [facility]! [employer] introduces [employer] patient portal. Now you can access parts of your medical record, email your doctor's office, and request medication refills online. 1. In your internet browser, go to [email or web address] 2. Click on the First Time User? Click Here link in the Sign In box. You will see the New Member Sign Up page. 3. Enter your [employer] Access Code exactly as it appears below. You will not need to use this code after youve completed the sign-up process. If you do not sign up before the expiration date, you must request a new code. · [employer] Access Code: [email or web address] Expires: 8/7/2018  2:41 PM 
 
4. Enter the last four digits of your Social Security Number (xxxx) and Date of Birth (mm/dd/yyyy) as indicated and click Submit. You will be taken to the next sign-up page. 5. Create a AllThe[employer] ID. This will be your [employer] login ID and cannot be changed, so think of one that is secure and easy to remember. 6. Create a [employer] password. You can change your password at any time. 7. Enter your Password Reset Question and Answer. This can be used at a later time if you forget your password. 8. Enter your e-mail address. You will receive e-mail notification when new information is available in [address] 9. Click Sign Up. You can now view and download portions of your medical record. 10. Click the Download Summary menu link to download a portable copy of your medical information. If you have questions, please visit the Frequently Asked Questions section of the [employer] website. Remember, [employer]TheRo[employer] is NOT to be used for urgent needs. For medical emergencies, dial 911. Now available from your iPhone and Android! Please provide this summary of care documentation to your next provider. Your primary care clinician is listed as Naila Bryson. If you have any questions after today's visit, please call [phone].

## 2018-07-19 RX ORDER — ATORVASTATIN CALCIUM 40 MG/1
TABLET, FILM COATED ORAL
Qty: 90 TAB | Refills: 0 | Status: SHIPPED | OUTPATIENT
Start: 2018-07-19 | End: 2018-10-10 | Stop reason: SDUPTHER

## 2018-08-06 ENCOUNTER — TELEPHONE (OUTPATIENT)
Dept: NEUROLOGY | Age: 78
End: 2018-08-06

## 2018-08-06 NOTE — TELEPHONE ENCOUNTER
8/5/2018--Message from [facility],\"pt of Dr. Shadi Ascencio and we have a question about medication. \"

## 2018-08-13 ENCOUNTER — TELEPHONE (OUTPATIENT)
Dept: NEUROLOGY | Age: 78
End: 2018-08-13

## 2018-08-13 NOTE — TELEPHONE ENCOUNTER
Spoke with , she states seroquel medication didn't help all weekend. She wanted to know if it could be increased. She is taking patient to [facility] ED for eval for his condition/not sleeping.

## 2018-08-15 ENCOUNTER — TELEPHONE (OUTPATIENT)
Dept: NEUROLOGY | Age: 78
End: 2018-08-15

## 2018-08-15 NOTE — TELEPHONE ENCOUNTER
----- Message from Jarrod Block sent at 8/15/2018 12:42 PM EDT -----  Regarding:  Silvestre Larissa  Pt's daughter [phone], pt's daughter said since he started the new medication to assist him in sleeping , they are having a hard time waking him up in the morning, sometime going into noon, time, she would like to know should they wake him up around 8am or 9am or should they let him sleep.

## 2018-09-12 ENCOUNTER — TELEPHONE (OUTPATIENT)
Dept: NEUROLOGY | Age: 78
End: 2018-09-12

## 2018-09-13 NOTE — TELEPHONE ENCOUNTER
----- Message from Lori Joshi sent at 9/13/2018 12:21 PM EDT -----  Regarding: Dr. Francisco Javier England  Pt's daughter(Helen) stated she would like a call from the doctor concerning pt not sleeping and being a little more aggressive. Best contact number [phone].

## 2018-09-13 NOTE — TELEPHONE ENCOUNTER
Spoke with daughter, she states patient is taking seroquel 12.5mg nightly but still not sleeping and being aggressive. States he is staying up until maribell some nights. She wants to know if anything can be done.

## 2018-09-13 NOTE — TELEPHONE ENCOUNTER
Spoke with , informed her  would like patient to take seroquel 25mg every bedtime. She verbalized understanding.

## 2018-09-17 RX ORDER — QUETIAPINE FUMARATE 25 MG/1
25 TABLET, FILM COATED ORAL
Qty: 30 TAB | Refills: 2 | Status: SHIPPED | OUTPATIENT
Start: 2018-09-17 | End: 2018-12-15 | Stop reason: SDUPTHER

## 2018-09-17 NOTE — TELEPHONE ENCOUNTER
Requested Prescriptions     Pending Prescriptions Disp Refills    QUEtiapine (SEROQUEL) 25 mg tablet 30 Tab 2     Sig: Take 0.5 Tabs by mouth nightly.

## 2018-09-17 NOTE — TELEPHONE ENCOUNTER
Spoke with ey, informed her seroquel 25mg 1 tab at bedtime order was sent in instead of previous seroquel order. She wanted to know if patient could take melatonin along with seroquel due to still having trouble sleeping on increased dose.

## 2018-12-18 ENCOUNTER — TELEPHONE (OUTPATIENT)
Dept: NEUROLOGY | Age: 78
End: 2018-12-18

## 2018-12-18 NOTE — TELEPHONE ENCOUNTER
----- Message from Dmitri Ellington sent at 12/18/2018  9:51 AM EST -----  Regarding: Dr. Ladoris Paget  Ms. Clark pt's daughter,is calling for refill on pt's \"Seraquel\" pt's dementia medication. Pt using [facility] in [place]. Pt's contact is [phone]. Completely out

## 2019-01-16 RX ORDER — QUETIAPINE FUMARATE 25 MG/1
TABLET, FILM COATED ORAL
Qty: 30 TAB | Refills: 0 | Status: SHIPPED | OUTPATIENT
Start: 2019-01-16 | End: 2019-01-24 | Stop reason: SDUPTHER

## 2019-01-16 NOTE — TELEPHONE ENCOUNTER
Requested Prescriptions     Pending Prescriptions Disp Refills    QUEtiapine (SEROQUEL) 25 mg tablet [Pharmacy Med Name: QUEtiapine Fumarate 25MG TAB] 30 Tab 0     Sig: TAKE 1 TABLET BY MOUTH NIGHTLY

## 2019-01-24 ENCOUNTER — OFFICE VISIT (OUTPATIENT)
Dept: NEUROLOGY | Age: 79
End: 2019-01-24

## 2019-01-24 VITALS
SYSTOLIC BLOOD PRESSURE: 132 MMHG | RESPIRATION RATE: 18 BRPM | HEART RATE: 81 BPM | DIASTOLIC BLOOD PRESSURE: 70 MMHG | OXYGEN SATURATION: 98 % | BODY MASS INDEX: 21.79 KG/M2 | WEIGHT: 135 LBS

## 2019-01-24 DIAGNOSIS — E53.8 B12 DEFICIENCY: ICD-10-CM

## 2019-01-24 DIAGNOSIS — F01.50 MIXED ALZHEIMER'S AND VASCULAR DEMENTIA (HCC): Primary | ICD-10-CM

## 2019-01-24 DIAGNOSIS — Z86.73 REMOTE HISTORY OF STROKE: ICD-10-CM

## 2019-01-24 DIAGNOSIS — F02.80 MIXED ALZHEIMER'S AND VASCULAR DEMENTIA (HCC): Primary | ICD-10-CM

## 2019-01-24 DIAGNOSIS — R45.1 AGITATION: ICD-10-CM

## 2019-01-24 DIAGNOSIS — G30.9 MIXED ALZHEIMER'S AND VASCULAR DEMENTIA (HCC): Primary | ICD-10-CM

## 2019-01-24 RX ORDER — MELATONIN 5 MG
5 CAPSULE ORAL
COMMUNITY
End: 2021-05-16

## 2019-01-24 RX ORDER — QUETIAPINE FUMARATE 25 MG/1
TABLET, FILM COATED ORAL
Qty: 90 TAB | Refills: 1 | Status: SHIPPED | OUTPATIENT
Start: 2019-01-24 | End: 2020-04-30 | Stop reason: SDUPTHER

## 2019-01-24 RX ORDER — MEMANTINE HYDROCHLORIDE 10 MG/1
10 TABLET ORAL DAILY
Qty: 90 TAB | Refills: 1 | Status: SHIPPED | OUTPATIENT
Start: 2019-01-24 | End: 2019-07-02 | Stop reason: SDUPTHER

## 2019-01-24 RX ORDER — DONEPEZIL HYDROCHLORIDE 10 MG/1
10 TABLET, FILM COATED ORAL
Qty: 90 TAB | Refills: 1 | Status: SHIPPED | OUTPATIENT
Start: 2019-01-24 | End: 2019-06-28 | Stop reason: SDUPTHER

## 2019-01-24 NOTE — PATIENT INSTRUCTIONS
[facility]   Statement to Patients  April 1, 2014      In an effort to ensure the large volume of patient prescription refills is processed in the most efficient and expeditious manner, we are asking our patients to assist us by calling your Pharmacy for all prescription refills, this will include also your  Mail Order Pharmacy. The pharmacy will contact our office electronically to continue the refill process. Please do not wait until the last minute to call your pharmacy. We need at least 48 hours (2days) to fill prescriptions. We also encourage you to call your pharmacy before going to  your prescription to make sure it is ready. With regard to controlled substance prescription refill requests (narcotic refills) that need to be picked up at our office, we ask your cooperation by providing us with at least 72 hours (3days) notice that you will need a refill. We will not refill narcotic prescription refill requests after 4:00pm on any weekday, Monday through Thursday, or after 2:00pm on Fridays, or on the weekends. We encourage everyone to explore another way of getting your prescription refill request processed using [employer], our patient web portal through our electronic medical record system. [employer] is an efficient and effective way to communicate your medication request directly to the office and  downloadable as an corona on your smart phone . [employer] also features a review functionality that allows you to view your medication list as well as leave messages for your physician. Are you ready to get connected? If so please review the attatched instructions or speak to any of our staff to get you set up right away! Thank you so much for your cooperation. Should you have any questions please contact our Practice Administrator.     The Physicians and Staff,  [facility]

## 2019-01-24 NOTE — PROGRESS NOTES
Neurology Clinic Follow up Note    Patient ID:  Davidson Love  [id number]  79 y.o.  1940      Mr. Azeb Pugh is here for follow up today of dementia       Last Appointment With Me:  5/2018    Interval History:   Cognitive deficits have progressed slowly since last visit. In the evenings, he is more confused. No aggression, mild agitation. Behavior is controlled on the Seroquel. He is not driving. Needs assistance with meal prep and medication administration. Otherwise independent for ADLs. He is sleeping well. No falls. Pt is eating regularly. No weight loss noted. Residing at home with his wife. Denies side effects from Aricept/Namenda at current dosing. Not able to afford Namzaric and did not tolerate higher dosing of Namenda (increasing confusion per family). Taking B12 1000mcg daily due to deficiency noted on labs. Taking ASA/Lipitor for stroke prevention. Recently dx with lung mass with plans for bx this upcoming week. PMHx/ PSHx/ FHx/ SHx:  Reviewed and unchanged previous visit. Past Medical History:   Diagnosis Date    Memory loss     Poor appetite          ROS:  Comprehensive review of systems negative except for as noted above. Objective:       Meds:  Current Outpatient Medications   Medication Sig Dispense Refill    melatonin 5 mg cap capsule Take 5 mg by mouth nightly.  QUEtiapine (SEROQUEL) 25 mg tablet TAKE 1 TABLET BY MOUTH NIGHTLY 30 Tab 0    atorvastatin (LIPITOR) 40 mg tablet TAKE 1 TABLET BY MOUTH ONCE DAILY 30 Tab 0    LORazepam (ATIVAN) 0.5 mg tablet TAKE 1 TABLET BY MOUTH AS NEEDED FOR  AGITATION  . DO NOT EXCEED  DAILY  AMOUNT  0.5MG 30 Tab 4    donepezil (ARICEPT) 10 mg tablet Take 1 Tab by mouth nightly. 90 Tab 1    memantine (NAMENDA) 10 mg tablet Take 1 Tab by mouth daily. 90 Tab 1    aspirin delayed-release 81 mg tablet Take  by mouth daily.  cyanocobalamin (VITAMIN B-12) 1,000 mcg tablet Take 1,000 mcg by mouth daily. Visit Vitals  /70   Pulse 81   Resp 18   Wt 61.2 kg (135 lb)   SpO2 98%   BMI 21.79 kg/m²      NEUROLOGICAL EXAM:  General: Awake, alert, speech fluent. Disoriented to date, knows \"Lozano\", self, place. CN: PERRL, EOMI without nystagmus, VFF to confrontation, facial sensation and strength are normal and symmetric, hearing is intact to finger rub bilaterally, palate and tongue movements are intact and symmetric. Motor: Normal tone, bulk and strength bilaterally. Reflexes: 1/4 and symmetric, plantar stimulation is flexor. Coordination: FNF, HEATHER, HTS intact. Sensation: LT intact throughout. Gait: Normal based      LABS  Results for orders placed or performed in visit on 10/06/16   LIPID PANEL   Result Value Ref Range    Cholesterol, total 182 100 - 199 mg/dL    Triglyceride 121 0 - 149 mg/dL    HDL Cholesterol 43 >39 mg/dL    VLDL, calculated 24 5 - 40 mg/dL    LDL, calculated 115 (H) 0 - 99 mg/dL       IMAGING:  MRI Results (most recent):  Results from Hospital Encounter encounter on 07/14/16   MRI BRAIN WO CONT    Narrative **Final Report**       ICD Codes / Adm. Diagnosis: 331.0  294.10 / Alzheimer's disease  Dementia in   conditions classif  Examination:  MR BRAIN WO CON  - [id number] - Jul 14 2016  6:09PM  Accession No:  [id number]  Reason:  dementia      REPORT:  EXAM:  MR BRAIN WO CON    INDICATION:  Progressing short-term memory loss over the past 8 months. Tobacco use. COMPARISON: None. TECHNIQUE: Sagittal T1; coronal T2; axial T1, T2, FLAIR, gradient-echo, and   diffusion weighted noncontrast MRI of the brain. FINDINGS: Moderate cerebral volume loss for age. No hydrocephalus. No mass   effect or midline shift. No extra-axial fluid collection. Volume loss and hyperintense T2 signal in the left occipital lobe indicates   old infarct. There is ex vacuo dilatation of the occipital horn of the left   lateral ventricle.     Moderate volume of hyperintense T2/FLAIR signal in the cerebral white matter   is predominantly periventricular in the frontal and parietal lobes. No   restricted diffusion to indicate acute infarct. No blood flow in the left vertebral artery. Right vertebral artery and   basilar artery flow voids are patent. Anterior circulation flow-voids are   patent. Medial temporal lobes are symmetric. The midline structures, including the   cervicomedullary junction, are within normal limits. IMPRESSION:    1. Volume loss and chronic microvascular ischemic disease. 2. Old left occipital infarct. 3. No acute infarct or mass effect. 4. Evidence of left vertebral artery occlusion. Signing/Reading Doctor: Heraclio Beasley ([id number])    Approved: Heraclio Beasley ([id number])  Jul 15 2016  8:58AM                                          Assessment:     Encounter Diagnoses     ICD-10-CM ICD-9-CM   1. 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 Remote history of stroke Z86.73 V12.54   3. B12 deficiency E53.8 266.2   4. Agitation R45.1 307.9     70 yo AAM presenting for f/u regarding progressive cognitive impairment. [address] [place] [address] 14/30 c/w moderate dementia. MRI Brain completed and demonstrates moderate burden of microvascular ischemia, remote L occipital infarct and mod to severe global atrophy prominent over the R>L temporal lobes. 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 In addition, laboratory investigations for underlying metabolic derangements revealed B12 deficiency. Family reporting ongoing slow decline in cognition with severe recall deficits, disorientation and mild agitation at times. Agitation is being controlled with low dose Ativan. Discussed trial of Namzaric (combination Aricept/namenda) which may further assist with his cognitive deficits, however this was too expensive. Attempted titration of Namenda, however family noted increasing confusion after dose titration.   Discussed re-trial of Namenda at higher dosing- he has done well with this since last visit. Advised that he continue to abstain from driving. Plan:   Continue Aricept 10mg/d and Namenda 10mg/d   Cont. Seroquel 25mg qhs for agitation  Cont. ASA 81mg, Lipitor for stroke prevention  Cont. B12 supplementation  Continue to abstain from driving  Heart healthy diet and exercise  Regular scheduled cognitive and social engagement. Follow-up Disposition:  Return in about 6 months (around 7/24/2019).     Signed:  Rebeca Tolentino, DO  1/24/2019

## 2019-01-30 ENCOUNTER — TELEPHONE (OUTPATIENT)
Dept: NEUROLOGY | Age: 79
End: 2019-01-30

## 2019-01-30 NOTE — TELEPHONE ENCOUNTER
Spoke with Ms. Rosi Reed, informed her paper work was with . Would call her back when  has reviewed it. She verbalized understanding.

## 2019-01-30 NOTE — TELEPHONE ENCOUNTER
Pt's daughter calling re paperwork that was left for Dr. Jaylene Alvarez to fill out at pt's last appt, 1/24/2019.  Please call back

## 2019-04-02 ENCOUNTER — TELEPHONE (OUTPATIENT)
Dept: NEUROLOGY | Age: 79
End: 2019-04-02

## 2019-04-02 NOTE — TELEPHONE ENCOUNTER
I spoke with this pts daughter. He has been on Augmentin since last Thursday for his UTI, but the symptoms he is having are still bad. She also notes that he is having increase shaking and can barely feed himself because of it. Could the Augmentin be the cause of this?

## 2019-04-02 NOTE — TELEPHONE ENCOUNTER
Patient's daughter, Helena Ware, calling stating that patient has a UTI - patient's primary care prescribed him rx (daughter forgot rx name) and wants a nurse to call back to to make sure that new rx isn't effecting others meds for patient is having bad episodes again since on new rx; agitated, wants to drive, disoriented of whereabouts. Patient's daughter will provide rx name when a nurse calls. Please advise.     Best # [phone] / [phone]

## 2019-04-03 NOTE — TELEPHONE ENCOUNTER
Likely the UTI is exacerbating his underlying disease rather than abx.  Symptoms should improve with resolving infection. JOSUE       Spoke with daughter again. He takes his last dose of atb today. Advised her to have pts urine rechecked in a few days to make sure UTI has cleared. She will call us back on Monday to update us on his symptoms. If still having increased tremors, she would like to have him seen sooner than July.

## 2019-06-13 ENCOUNTER — OFFICE VISIT (OUTPATIENT)
Dept: NEUROLOGY | Age: 79
End: 2019-06-13

## 2019-06-13 ENCOUNTER — HOSPITAL ENCOUNTER (OUTPATIENT)
Dept: NON INVASIVE DIAGNOSTICS | Age: 79
Discharge: HOME | End: 2019-06-13
Payer: MEDICARE

## 2019-06-13 VITALS
HEIGHT: 66 IN | WEIGHT: 128 LBS | SYSTOLIC BLOOD PRESSURE: 102 MMHG | HEART RATE: 64 BPM | BODY MASS INDEX: 20.57 KG/M2 | RESPIRATION RATE: 12 BRPM | OXYGEN SATURATION: 97 % | DIASTOLIC BLOOD PRESSURE: 60 MMHG

## 2019-06-13 DIAGNOSIS — F03.91 DEMENTIA WITH BEHAVIORAL DISTURBANCE, UNSPECIFIED DEMENTIA TYPE: Primary | ICD-10-CM

## 2019-06-13 DIAGNOSIS — G43.009 MIGRAINE WITHOUT AURA AND WITHOUT STATUS MIGRAINOSUS, NOT INTRACTABLE: ICD-10-CM

## 2019-06-13 DIAGNOSIS — F03.91 DEMENTIA WITH BEHAVIORAL DISTURBANCE, UNSPECIFIED DEMENTIA TYPE: ICD-10-CM

## 2019-06-13 PROCEDURE — 93005 ELECTROCARDIOGRAM TRACING: CPT

## 2019-06-13 RX ORDER — QUETIAPINE FUMARATE 25 MG/1
25 TABLET, FILM COATED ORAL 2 TIMES DAILY
Qty: 60 TAB | Refills: 3 | Status: SHIPPED | OUTPATIENT
Start: 2019-06-13 | End: 2019-11-26 | Stop reason: SDUPTHER

## 2019-06-13 NOTE — PROGRESS NOTES
Date:  19     Name:  Dominic Kennedy  :  1940  MRN:  [id number]     PCP:  Salvatore Fairbanks NP    Chief Complaint   Patient presents with    Neurologic Problem       HISTORY OF PRESENT ILLNESS:  Follow up visit for dementia. Accompanied by his daughters and wife. They report that his behavior is getting worse. He is starting to wander outside to the car. He states he is going to the store. Then, he will return to the house. He will have episodes where he will start hitting the counter. Most of this is triggered by an urge to use cigarette again. Mother and daughter report that they recently gave in and brought his cigarettes about 3-4 weeks ago. Wife reports that he smokes a total of 7 cigarettes a day. She notes that he forgets that he already had a cigarette and wants another. He will then get upset if he does not have it. Not very active during the day. He does not want to socialize. She a lot of reports that she can't stop him from drinking Pepsi. He refuses to drink water and will only have soda daily. As for his medications, He is currently on Aricept and Namenda. No side effects. He is on Seroquel for agitation. Current Outpatient Medications   Medication Sig    multivitamin/iron/folic acid (CENTRUM PO) Take  by mouth.  QUEtiapine (SEROQUEL) 25 mg tablet Take 1 Tab by mouth two (2) times a day.  atorvastatin (LIPITOR) 40 mg tablet TAKE 1 TABLET BY MOUTH ONCE DAILY    melatonin 5 mg cap capsule Take 5 mg by mouth nightly.  QUEtiapine (SEROQUEL) 25 mg tablet TAKE 1 TABLET BY MOUTH NIGHTLY    aspirin delayed-release 81 mg tablet Take 325 mg by mouth daily.  cyanocobalamin (VITAMIN B-12) 1,000 mcg tablet Take 1,000 mcg by mouth daily.  donepezil (ARICEPT) 10 mg tablet TAKE 1 TABLET BY MOUTH NIGHTLY    memantine (NAMENDA) 10 mg tablet Take 1 Tab by mouth daily.  LORazepam (ATIVAN) 0.5 mg tablet TAKE 1 TABLET BY MOUTH AS NEEDED FOR  AGITATION  .  DO NOT EXCEED DAILY  AMOUNT  0.5MG     No current facility-administered medications for this visit. No Known Allergies  Past Medical History:   Diagnosis Date    Memory loss     Poor appetite      Past Surgical History:   Procedure Laterality Date    ABDOMEN SURGERY PROC UNLISTED      adhesions    HX PROSTATECTOMY       Social History     Socioeconomic History    Marital status:      Spouse name: Not on file    Number of children: Not on file    Years of education: Not on file    Highest education level: Not on file   Occupational History    Not on file   Social Needs    Financial resource strain: Not on file    Food insecurity:     Worry: Not on file     Inability: Not on file    Transportation needs:     Medical: Not on file     Non-medical: Not on file   Tobacco Use    Smoking status: Former Smoker    Smokeless tobacco: Never Used   Substance and Sexual Activity    Alcohol use: No    Drug use: No    Sexual activity: Not on file   Lifestyle    Physical activity:     Days per week: Not on file     Minutes per session: Not on file    Stress: Not on file   Relationships    Social connections:     Talks on phone: Not on file     Gets together: Not on file     Attends Sabianist service: Not on file     Active member of club or organization: Not on file     Attends meetings of clubs or organizations: Not on file     Relationship status: Not on file    Intimate partner violence:     Fear of current or ex partner: Not on file     Emotionally abused: Not on file     Physically abused: Not on file     Forced sexual activity: Not on file   Other Topics Concern    Not on file   Social History Narrative    Not on file     Family History   Family history unknown: Yes       PHYSICAL EXAMINATION:    Visit Vitals  /60   Pulse 64   Resp 12   Ht 5' 6\" (1.676 m)   Wt 58.1 kg (128 lb)   SpO2 97%   BMI 20.66 kg/m²     General: Well defined, nourished, and groomed individual in no acute distress.    Neck: Supple, nontender, no bruits, no pain with resistance to active range of motion. Heart: Regular rate and rhythm, no murmurs, rub, or gallop. Normal S1S2. Lungs: Clear to auscultation bilaterally with equal chest expansion, no cough, no wheeze  Musculoskeletal: Extremities revealed no edema and had full range of motion of joints. Psych: Good mood and bright affect      NEUROLOGICAL EXAMINATION:   Mental Status: Alert and oriented to person, place, and time       Cranial Nerves:   II, III, IV, VI: Visual acuity grossly intact. Visual fields are normal.   Pupils are equal, round, and reactive to light and accommodation. Extra-ocular movements are full and fluid. Fundoscopic exam was benign, no ptosis or nystagmus. V-XII: Hearing is grossly intact. Facial features are symmetric, with normal sensation and strength. The palate rises symmetrically and the tongue protrudes midline. Sternocleidomastoids 5/5.       Motor Examination: Normal tone, bulk, and strength, 5/5 muscle strength throughout.       Coordination: No resting or intention tremor      Gait and Station: Steady while walking. Normal arm swing. No pronator drift. No muscle wasting or fasiculations noted.       Reflexes: DTRs 2+ throughout. ASSESSMENT AND PLAN    ICD-10-CM ICD-9-CM    1. Dementia with behavioral disturbance, unspecified dementia type F03.91 294.21 EKG, 12 LEAD, INITIAL   2. Migraine without aura and without status migrainosus, not intractable G43.009 346.10 EKG, 12 LEAD, INITIAL   Continue Aricept 10mg/d and Namenda 10mg/d   Increase Seroquel 25mg BID for agitation. Will get an EKG to monitor heart   Cont. ASA 81mg, Lipitor for stroke prevention  Cont. B12 supplementation  Continue to abstain from driving  Heart healthy diet and exercise  Regular scheduled cognitive and social engagement. This note will not be viewable in [address]   Piter Otero NP

## 2019-06-13 NOTE — PROGRESS NOTES
Mr. Nacho Tsang presents today to follow up Alzheimer's. His agitation has increased slightly.  Depression screening done on patient,

## 2019-06-13 NOTE — PATIENT INSTRUCTIONS
PRESCRIPTION REFILL POLICY [facility] Statement to Patients April 1, 2014 In an effort to ensure the large volume of patient prescription refills is processed in the most efficient and expeditious manner, we are asking our patients to assist us by calling your Pharmacy for all prescription refills, this will include also your  Mail Order Pharmacy. The pharmacy will contact our office electronically to continue the refill process. Please do not wait until the last minute to call your pharmacy. We need at least 48 hours (2days) to fill prescriptions. We also encourage you to call your pharmacy before going to  your prescription to make sure it is ready. With regard to controlled substance prescription refill requests (narcotic refills) that need to be picked up at our office, we ask your cooperation by providing us with at least 72 hours (3days) notice that you will need a refill. We will not refill narcotic prescription refill requests after 4:00pm on any weekday, Monday through Thursday, or after 2:00pm on Fridays, or on the weekends. We encourage everyone to explore another way of getting your prescription refill request processed using ePAR, our patient web portal through our electronic medical record system. ePAR is an efficient and effective way to communicate your medication request directly to the office and  downloadable as an corona on your smart phone . ePAR also features a review functionality that allows you to view your medication list as well as leave messages for your physician. Are you ready to get connected? If so please review the attatched instructions or speak to any of our staff to get you set up right away! Thank you so much for your cooperation. Should you have any questions please contact our Practice Administrator. The Physicians and Staff,  [facility]

## 2019-06-14 LAB
ATRIAL RATE: 55 BPM
CALCULATED P AXIS, ECG09: 78 DEGREES
CALCULATED R AXIS, ECG10: -3 DEGREES
CALCULATED T AXIS, ECG11: -147 DEGREES
DIAGNOSIS, 93000: NORMAL
P-R INTERVAL, ECG05: 200 MS
Q-T INTERVAL, ECG07: 472 MS
QRS DURATION, ECG06: 122 MS
QTC CALCULATION (BEZET), ECG08: 451 MS
VENTRICULAR RATE, ECG03: 55 BPM

## 2019-07-02 RX ORDER — DONEPEZIL HYDROCHLORIDE 10 MG/1
TABLET, FILM COATED ORAL
Qty: 30 TAB | Refills: 0 | Status: SHIPPED | OUTPATIENT
Start: 2019-07-02 | End: 2019-08-05 | Stop reason: SDUPTHER

## 2019-07-02 RX ORDER — MEMANTINE HYDROCHLORIDE 10 MG/1
10 TABLET ORAL DAILY
Qty: 30 TAB | Refills: 0 | Status: SHIPPED | OUTPATIENT
Start: 2019-07-02 | End: 2019-08-05 | Stop reason: SDUPTHER

## 2019-07-02 NOTE — TELEPHONE ENCOUNTER
Requested Prescriptions     Pending Prescriptions Disp Refills    donepezil (ARICEPT) 10 mg tablet [Pharmacy Med Name: DONEPEZIL HCL 10MG TAB] 90 Tab 1     Sig: TAKE 1 TABLET BY MOUTH NIGHTLY    memantine (NAMENDA) 10 mg tablet 90 Tab 1     Sig: Take 1 Tab by mouth daily. Patient's wife calling in regards to medication stating that patient is in need of rxs. Patient is out of both medications. Please advise.       Best # [phone]

## 2019-07-15 RX ORDER — QUETIAPINE FUMARATE 25 MG/1
25 TABLET, FILM COATED ORAL 2 TIMES DAILY
Qty: 60 TAB | Refills: 3 | Status: CANCELLED | OUTPATIENT
Start: 2019-07-15

## 2019-07-15 NOTE — TELEPHONE ENCOUNTER
----- Message from Jose Jason [id number] sent at 7/15/2019  9:22 AM EDT -----  Regarding: SOBEIDA Hale/ refill  Medication Refill    Caller (if not patient): Wagner Scott       Relationship of caller (if not patient): daughter      Best contact number(s): [phone]      Name of medication and dosage if known: quetiapine      Is patient out of this medication (yes/no): yes      Pharmacy name: [facility] listed in chart? (yes/no): yes  Pharmacy phone number: n/a      Details to clarify the request: pt was told an appt is needed but was just seen on 6/13      Danilo Crow

## 2019-07-15 NOTE — TELEPHONE ENCOUNTER
Requested Prescriptions     Pending Prescriptions Disp Refills    QUEtiapine (SEROQUEL) 25 mg tablet 60 Tab 3     Sig: Take 1 Tab by mouth two (2) times a day. Pt is out of meds.  Please call back

## 2019-07-15 NOTE — TELEPHONE ENCOUNTER
Spoke with Ms. Rosa Simons, she states pharmacy did not get e-scribed Seroquel at last visit. Provided pharmacy with verbal order per NP Violetta Phan order.

## 2019-07-31 ENCOUNTER — TELEPHONE (OUTPATIENT)
Dept: NEUROLOGY | Age: 79
End: 2019-07-31

## 2019-07-31 NOTE — TELEPHONE ENCOUNTER
* Message from [employer] below:        ----- Message from Emily Chavez sent at 7/31/2019  2:59 PM EDT -----  Regarding: Dr. Karen Kovacs daughter Sandra Orlando requesting prior authorization sent to Eloisa Hoover on file for Rx \"Aricept\". Best contact [phone].

## 2019-08-01 NOTE — TELEPHONE ENCOUNTER
Spoke with daughter, informed her that PA was still being processed. To hopefully have PA response from insurance within the next day.

## 2019-08-01 NOTE — TELEPHONE ENCOUNTER
Referred PA to [facility], who is processing PA's for Dr. Mp Keane. Although patient saw SOBEIDA Reinoso last, aricept was written by Mp Keane.

## 2019-08-02 ENCOUNTER — TELEPHONE (OUTPATIENT)
Dept: NEUROLOGY | Age: 79
End: 2019-08-02

## 2019-08-02 NOTE — TELEPHONE ENCOUNTER
----- Message from Christina Neri sent at 8/2/2019  3:06 PM EDT -----  Regarding: /Refill  Medication Refill    Caller (if not patient): Alberta Clark      Relationship of caller (if not patient):       Best contact number(s): [phone]      Name of medication and dosage if known : Donepezil HCL 10mg      Is patient out of this medication (yes/no): yes      Pharmacy name: [facility] listed in chart? (yes/no): yes  Pharmacy phone number: [phone]      Details to clarify the request: requesting a prior authorization .  Ms Brandy Hoskins stated if it would be quicker to called in the prescription and she will pay out of pocket      Christina Neri

## 2019-08-05 RX ORDER — DONEPEZIL HYDROCHLORIDE 10 MG/1
TABLET, FILM COATED ORAL
Qty: 30 TAB | Refills: 3 | Status: SHIPPED | OUTPATIENT
Start: 2019-08-05 | End: 2019-08-06 | Stop reason: SDUPTHER

## 2019-08-05 NOTE — TELEPHONE ENCOUNTER
----- Message from Jose Gomez [id number] sent at 8/5/2019  2:05 PM EDT -----  Regarding: John Hale/ refill  Medication Refill    Caller (if not patient): Lolly Briggs      Relationship of caller (if not patient): daughter      Best contact number(s): [phone]      Name of medication and dosage if known: donepezil      Is patient out of this medication (yes/no): Yes      Pharmacy name: [facility] listed in chart? (yes/no): Yes  Pharmacy phone number:       Details to clarify the request: the first request was sent a week ago, but the pharmacy hasnt gotten a response       Danilo Crow

## 2019-08-06 RX ORDER — MEMANTINE HYDROCHLORIDE 10 MG/1
10 TABLET ORAL DAILY
Qty: 30 TAB | Refills: 2 | Status: SHIPPED | OUTPATIENT
Start: 2019-08-06 | End: 2019-11-26 | Stop reason: SDUPTHER

## 2019-08-06 RX ORDER — DONEPEZIL HYDROCHLORIDE 10 MG/1
TABLET, FILM COATED ORAL
Qty: 30 TAB | Refills: 2 | Status: SHIPPED | OUTPATIENT
Start: 2019-08-06 | End: 2019-11-26 | Stop reason: SDUPTHER

## 2019-10-14 NOTE — TELEPHONE ENCOUNTER
----- Message from Cathryn Love sent at 10/14/2019 10:01 AM EDT -----  Regarding: Dr. Scotty Bell (if not patient):      Relationship of caller (if not patient):      Best contact number(s):       Name of medication and dosage if known:    Atorvastatin      Is patient out of this medication (yes/no): yes  Pharmacy name: [address] [place] [address] listed in chart? (yes/no): yes  Pharmacy phone number:      Details to clarify the request:      Cathryn Love

## 2019-10-15 ENCOUNTER — TELEPHONE (OUTPATIENT)
Dept: NEUROLOGY | Age: 79
End: 2019-10-15

## 2019-10-15 RX ORDER — ATORVASTATIN CALCIUM 40 MG/1
TABLET, FILM COATED ORAL
Qty: 90 TAB | Refills: 0 | Status: SHIPPED | OUTPATIENT
Start: 2019-10-15 | End: 2020-01-15 | Stop reason: SDUPTHER

## 2019-10-15 NOTE — TELEPHONE ENCOUNTER
----- Message from Socorro Leon sent at 10/15/2019 12:08 PM EDT -----  Regarding: Johnna Constantino   Medication Refill    Caller (if not patient): Alberta Clark     Relationship of caller (if not patient): Wife     Best contact number(s): [phone]    Name of medication and dosage if known: ((atorvastatin 40 mg  ))    Is patient out of this medication (yes/no): yes( last pill was Friday )    Pharmacy name:[facility]     Pharmacy listed in chart? (yes/no):  Pharmacy phone number:[phone]    Details to clarify the request:Alberta Clark stated that the Pharmacy has not heard back about the request for refill and she would like to know what os the hold up. Request was sent out Oct 10.        Socorro Leon

## 2019-11-26 ENCOUNTER — OFFICE VISIT (OUTPATIENT)
Dept: NEUROLOGY | Age: 79
End: 2019-11-26

## 2019-11-26 VITALS
HEART RATE: 65 BPM | RESPIRATION RATE: 18 BRPM | OXYGEN SATURATION: 98 % | DIASTOLIC BLOOD PRESSURE: 56 MMHG | SYSTOLIC BLOOD PRESSURE: 102 MMHG

## 2019-11-26 DIAGNOSIS — Z86.73 REMOTE HISTORY OF STROKE: ICD-10-CM

## 2019-11-26 DIAGNOSIS — F03.91 DEMENTIA WITH BEHAVIORAL DISTURBANCE, UNSPECIFIED DEMENTIA TYPE: Primary | ICD-10-CM

## 2019-11-26 RX ORDER — QUETIAPINE FUMARATE 25 MG/1
25 TABLET, FILM COATED ORAL 2 TIMES DAILY
Qty: 180 TAB | Refills: 1 | Status: SHIPPED | OUTPATIENT
Start: 2019-11-26 | End: 2020-06-25 | Stop reason: DRUGHIGH

## 2019-11-26 RX ORDER — DONEPEZIL HYDROCHLORIDE 10 MG/1
TABLET, FILM COATED ORAL
Qty: 90 TAB | Refills: 1 | Status: SHIPPED | OUTPATIENT
Start: 2019-11-26 | End: 2020-04-30 | Stop reason: SDUPTHER

## 2019-11-26 RX ORDER — MEMANTINE HYDROCHLORIDE 10 MG/1
10 TABLET ORAL DAILY
Qty: 90 TAB | Refills: 1 | Status: SHIPPED | OUTPATIENT
Start: 2019-11-26 | End: 2020-04-30 | Stop reason: SDUPTHER

## 2019-11-26 NOTE — PROGRESS NOTES
Neurology Clinic Follow up Note    Patient ID:  Teddy Barriga  [id number]  88 y.o.  1940      Mr. Mile Aguila is here for follow up today of dementia       Last Appointment With Me:  1/24/19, saw NP Temo Chavarria 6/2019    Interval History:   Cognitive deficits have progressed slowly since last visit. More confusion noted in the evenings and early mornings. Still bathing/dressing independently. Needs help with meal prep, medications. Agitation is improved with increase of Seroquel. He becomes agitated when he is told he cannot smoke. He is sleeping well. No falls. Pt is eating regularly. He has gained some weight since last visit. Residing at home with his wife. Denies side effects from Aricept/Namenda at current dosing. Not able to afford Namzaric and did not tolerate higher dosing of Namenda (increasing confusion per family). Taking B12 1000mcg daily due to deficiency noted on labs. Taking ASA intermittently, Lipitor for stroke prevention. Recently dx with lung CA- family has elected for conservative management    PMHx/ PSHx/ FHx/ SHx:  Reviewed and unchanged previous visit. Past Medical History:   Diagnosis Date    Memory loss     Poor appetite          ROS:  Comprehensive review of systems negative except for as noted above. Objective:       Meds:  Current Outpatient Medications   Medication Sig Dispense Refill    atorvastatin (LIPITOR) 40 mg tablet TAKE 1 TABLET BY MOUTH ONCE DAILY 90 Tab 0    donepezil (ARICEPT) 10 mg tablet TAKE 1 TABLET BY MOUTH NIGHTLY 30 Tab 2    memantine (NAMENDA) 10 mg tablet Take 1 Tab by mouth daily. 30 Tab 2    QUEtiapine (SEROQUEL) 25 mg tablet Take 1 Tab by mouth two (2) times a day. 60 Tab 3    QUEtiapine (SEROQUEL) 25 mg tablet TAKE 1 TABLET BY MOUTH NIGHTLY 90 Tab 1    LORazepam (ATIVAN) 0.5 mg tablet TAKE 1 TABLET BY MOUTH AS NEEDED FOR  AGITATION  .  DO NOT EXCEED  DAILY  AMOUNT  0.5MG 30 Tab 4    aspirin delayed-release 81 mg tablet Take 81 mg by mouth. 2 times a week      multivitamin/iron/folic acid (CENTRUM PO) Take  by mouth.  melatonin 5 mg cap capsule Take 5 mg by mouth nightly.  cyanocobalamin (VITAMIN B-12) 1,000 mcg tablet Take 1,000 mcg by mouth daily. Visit Vitals  /56   Pulse 65   Resp 18   SpO2 98%      NEUROLOGICAL EXAM:  General: Awake, alert, speech fluent. Disoriented to date, knows \"Lozano\", self, place. CN: PERRL, EOMI without nystagmus, VFF to confrontation, facial sensation and strength are normal and symmetric, hearing is intact to finger rub bilaterally, palate and tongue movements are intact and symmetric. Motor: Normal tone, bulk and strength bilaterally. Reflexes: 1/4 and symmetric, plantar stimulation is flexor. Coordination: FNF, HEATHER, HTS intact. Sensation: LT intact throughout. Gait: Normal based      LABS  Results for orders placed or performed during the hospital encounter of 06/13/19   EKG, 12 LEAD, INITIAL   Result Value Ref Range    Ventricular Rate 55 BPM    Atrial Rate 55 BPM    P-R Interval 200 ms    QRS Duration 122 ms    Q-T Interval 472 ms    QTC Calculation (Bezet) 451 ms    Calculated P Axis 78 degrees    Calculated R Axis -3 degrees    Calculated T Axis -147 degrees    Diagnosis       Sinus bradycardia  Right bundle branch block  Left ventricular hypertrophy with QRS widening and repolarization abnormality  When compared with ECG of 27-APR-2015 01:37,  No significant change was found  Confirmed by Aleksandra Gonzalez MD, Terrie Bain ([id number]) on 6/14/2019 7:42:20 AM         IMAGING:  MRI Results (most recent):  Results from Hospital Encounter encounter on 07/14/16   MRI BRAIN WO CONT    Narrative **Final Report**       ICD Codes / Adm. Diagnosis: 331.0  294.10 / Alzheimer's disease  Dementia in   conditions classif  Examination:  MR BRAIN WO CON  - [id number] - Jul 14 2016  6:09PM  Accession No:  [id number]  Reason:  dementia      REPORT:  EXAM:  MR BRAIN WO CON    INDICATION:  Progressing short-term memory loss over the past 8 months. Tobacco use. COMPARISON: None. TECHNIQUE: Sagittal T1; coronal T2; axial T1, T2, FLAIR, gradient-echo, and   diffusion weighted noncontrast MRI of the brain. FINDINGS: Moderate cerebral volume loss for age. No hydrocephalus. No mass   effect or midline shift. No extra-axial fluid collection. Volume loss and hyperintense T2 signal in the left occipital lobe indicates   old infarct. There is ex vacuo dilatation of the occipital horn of the left   lateral ventricle. Moderate volume of hyperintense T2/FLAIR signal in the cerebral white matter   is predominantly periventricular in the frontal and parietal lobes. No   restricted diffusion to indicate acute infarct. No blood flow in the left vertebral artery. Right vertebral artery and   basilar artery flow voids are patent. Anterior circulation flow-voids are   patent. Medial temporal lobes are symmetric. The midline structures, including the   cervicomedullary junction, are within normal limits. IMPRESSION:    1. Volume loss and chronic microvascular ischemic disease. 2. Old left occipital infarct. 3. No acute infarct or mass effect. 4. Evidence of left vertebral artery occlusion. Signing/Reading Doctor: Tyra Brush ([id number])    Approved: yTra Brush ([id number])  Jul 15 2016  8:58AM                                          Assessment:     Encounter Diagnoses     ICD-10-CM ICD-9-CM   1. Dementia with behavioral disturbance, unspecified dementia type ([facility] F03.91 294.21   2. Remote history of stroke Z80.78 V16.53     68 yo AAM with recently diagnosed lung CA here for f/u regarding progressive cognitive impairment. [address] [place] [address] 14/30 c/w moderate dementia. MRI Brain completed and demonstrates moderate burden of microvascular ischemia, remote L occipital infarct and mod to severe global atrophy prominent over the R>L temporal lobes.   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 In addition, laboratory investigations for underlying metabolic derangements revealed B12 deficiency. Family reporting ongoing slow decline in cognition with severe recall deficits, disorientation and mild agitation at times. Agitation is improved with titration of Seroquel. Discussed trial of Namzaric (combination Aricept/namenda) which may further assist with his cognitive deficits, however this was too expensive. Attempted titration of Namenda, however family noted increasing confusion after dose titration. Will continue combination of Aricept and Namenda for his progressive AD. Plan:   Continue Aricept 10mg/d, Namenda 10mg qhs  Cont. Seroquel 25mg BID for agitation  Resume ASA 81mg, Lipitor for stroke prevention  Cont. B12 supplementation  Continue to abstain from driving  Heart healthy diet and exercise  Regular scheduled cognitive and social engagement. Follow-up and Dispositions    · Return in about 6 months (around 5/26/2020).            Signed:  Saima Sue DO  11/26/2019

## 2020-01-15 RX ORDER — ATORVASTATIN CALCIUM 40 MG/1
TABLET, FILM COATED ORAL
Qty: 90 TAB | Refills: 0 | Status: CANCELLED | OUTPATIENT
Start: 2020-01-15

## 2020-01-15 RX ORDER — ATORVASTATIN CALCIUM 40 MG/1
TABLET, FILM COATED ORAL
Qty: 90 TAB | Refills: 1 | Status: SHIPPED | OUTPATIENT
Start: 2020-01-15 | End: 2020-07-08 | Stop reason: SDUPTHER

## 2020-01-15 NOTE — TELEPHONE ENCOUNTER
Requested Prescriptions     Pending Prescriptions Disp Refills    atorvastatin (LIPITOR) 40 mg tablet 90 Tab 0     Sig: TAKE 1 TABLET BY MOUTH ONCE DAILY

## 2020-01-15 NOTE — TELEPHONE ENCOUNTER
Spoke with daughter, informed her that  would complete FMLA. She states she dropped the forms off about 45 minutes ago. Also patient needs a refill on Atorvastatin.

## 2020-01-15 NOTE — TELEPHONE ENCOUNTER
----- Message from Yesica Toro sent at 1/15/2020  8:10 AM EST -----  Regarding: Dr. Kelly Desai Message/Vendor Calls    Caller's first and last name: Mamie Love- daughter      Reason for call: Verify if her [place] can be [place] at this office and turn around time if so.        Callback required yes/no and why: yes      Best contact number(s): [phone]      Details to clarify the request:      Yesica Toro

## 2020-01-17 ENCOUNTER — TELEPHONE (OUTPATIENT)
Dept: NEUROLOGY | Age: 80
End: 2020-01-17

## 2020-01-17 NOTE — TELEPHONE ENCOUNTER
----- Message from Jose Gomez [id number] sent at 1/17/2020  3:17 PM EST -----  Regarding: dr greer/ telephone      Patient return call    Caller's first and last name and relationship (if not the patient):  Jack Santillan, oly       Best contact number(s): [phone] until 4:30      Whose call is being returned: Teddy Crystal      Details to clarify the request:      Jose Gomez [phone]

## 2020-04-30 NOTE — TELEPHONE ENCOUNTER
Spoke with Mrs. Theodore Valdivia, rescheduled patient for June. She is requesting refills on medications since he will run out before his appointment.

## 2020-05-13 RX ORDER — ATORVASTATIN CALCIUM 40 MG/1
TABLET, FILM COATED ORAL
Qty: 90 TAB | Refills: 0 | OUTPATIENT
Start: 2020-05-13

## 2020-05-13 RX ORDER — MEMANTINE HYDROCHLORIDE 10 MG/1
10 TABLET ORAL DAILY
Qty: 90 TAB | Refills: 0 | Status: SHIPPED | OUTPATIENT
Start: 2020-05-13 | End: 2021-05-16

## 2020-05-13 RX ORDER — QUETIAPINE FUMARATE 25 MG/1
TABLET, FILM COATED ORAL
Qty: 90 TAB | Refills: 0 | Status: SHIPPED | OUTPATIENT
Start: 2020-05-13 | End: 2020-06-25 | Stop reason: DRUGHIGH

## 2020-05-13 RX ORDER — DONEPEZIL HYDROCHLORIDE 10 MG/1
TABLET, FILM COATED ORAL
Qty: 90 TAB | Refills: 0 | Status: SHIPPED | OUTPATIENT
Start: 2020-05-13 | End: 2020-11-12

## 2020-06-25 ENCOUNTER — OFFICE VISIT (OUTPATIENT)
Dept: NEUROLOGY | Age: 80
End: 2020-06-25

## 2020-06-25 VITALS
SYSTOLIC BLOOD PRESSURE: 112 MMHG | HEIGHT: 66 IN | OXYGEN SATURATION: 100 % | HEART RATE: 71 BPM | DIASTOLIC BLOOD PRESSURE: 58 MMHG | BODY MASS INDEX: 19.44 KG/M2 | RESPIRATION RATE: 16 BRPM | WEIGHT: 121 LBS

## 2020-06-25 DIAGNOSIS — G30.9 MIXED ALZHEIMER'S AND VASCULAR DEMENTIA (HCC): ICD-10-CM

## 2020-06-25 DIAGNOSIS — F02.80 MIXED ALZHEIMER'S AND VASCULAR DEMENTIA (HCC): ICD-10-CM

## 2020-06-25 DIAGNOSIS — Z86.73 REMOTE HISTORY OF STROKE: ICD-10-CM

## 2020-06-25 DIAGNOSIS — F01.50 MIXED ALZHEIMER'S AND VASCULAR DEMENTIA (HCC): ICD-10-CM

## 2020-06-25 DIAGNOSIS — F03.91 DEMENTIA WITH BEHAVIORAL DISTURBANCE, UNSPECIFIED DEMENTIA TYPE: Primary | ICD-10-CM

## 2020-06-25 RX ORDER — QUETIAPINE FUMARATE 25 MG/1
25 TABLET, FILM COATED ORAL 3 TIMES DAILY
Qty: 180 TAB | Refills: 1 | Status: SHIPPED | OUTPATIENT
Start: 2020-06-25 | End: 2021-03-09 | Stop reason: SDUPTHER

## 2020-06-25 NOTE — PROGRESS NOTES
patient daughter states that the last few months the patient memory has gotten worse. patient daughter wants to increase the patient seroquel from one tablet to two tablets. in the afternoon his daughter is observing agitation from the patient she is looking for medication options or suggestions. patient daughter is requesting more testing to see what stage he is in. per dr Maurizio Chavez he was moderate stage 2 years ago. the patient appetite has also decreased he will eat breakfast and snack but not dinner he will just look at it but he will eat a lot of sweets. The patient is always requesting to go home or if they go out and get back home the patient states that is not his home. The patient wife states he looks for people that are not there he will also ask for  family members. .  Chief Complaint   Patient presents with    Follow-up    Memory Loss     patient daughter states that the last few months the patient memory has gotten worse. patient daughter wants to increase the patient seroquel from one tablet to two tablets. in the afternoon his daughter is observing agitation from the patient she is looking for medication options or suggestions. patient daughter is requesting more testing to see what stage he is in. per dr Maurizio Chavez he was moderate stage 2 years ago. the patient appetite has also decreased he will eat breakfast and snack but not dinner       .   Visit Vitals  /58 (BP 1 Location: Left arm, BP Patient Position: Sitting)   Pulse 71   Resp 16   Ht 5' 6\" (1.676 m)   Wt 121 lb (54.9 kg)   SpO2 100%   BMI 19.53 kg/m²

## 2020-06-25 NOTE — PROGRESS NOTES
Neurology Follow-up  VANNESSA Merritt      Visit Date: June 25, 2020    Patient: Gauri Anthony MRN: [id number]  SSN: [id number]    YOB: 1940  Age: 67 y.o. Sex: male      PCP: Ousmane Winn NP    Chief Complaint   Patient presents with    Follow-up    Memory Loss     patient daughter states that the last few months the patient memory has gotten worse. patient daughter wants to increase the patient seroquel from one tablet to two tablets. in the afternoon his daughter is observing agitation from the patient she is looking for medication options or suggestions. patient daughter is requesting more testing to see what stage he is in. per dr Priyank Crystal he was moderate stage 2 years ago. the patient appetite has also decreased he will eat breakfast and snack but not dinner       Previous records (physician notes, laboratory reports, and radiology reports) and imaging studies were reviewed and summarized. Subjective:     HPI: Gauri Anthony is a 67 y.o. 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 He first presented in 2016 with c/o short term memory loss, may forget where he puts his keys, where he places objects, forgets names of friends. No other significant symptoms. Last seen in Nov 2019 by Dr Herson Alfonso. Cognitive deficits have progressed slowly since last visit. More confusion noted in the evenings and early mornings. Still bathing/dressing independently. Needs help with meal prep, medications. Agitation is improved with increase of Seroquel. He becomes agitated when he is told he cannot smoke. He is sleeping well. No falls. Pt is eating regularly. He has gained some weight since last visit. Residing at home with his wife. Denies side effects from Aricept/Namenda at current dosing. Not able to afford Namzaric and did not tolerate higher dosing of Namenda (increasing confusion per family). Taking B12 1000mcg daily due to deficiency noted on labs. Taking ASA intermittently, Lipitor for stroke prevention. Recently dx with lung CA- family has elected for conservative management    Patient presents today, 2020, with his wife and daughter. Their chief concerns are what stage is his dementia at, what to expect as he progresses, and his reduced appetite, particularly dinner. They report that his Seroquel 25 mg prescription was called in incorrectly to just 1 pill at night, when it supposed to be 1 pill twice daily. Per family, Mr. Sarah Boone sleeps quite well after his Seroquel at night. In the morning he eats breakfast.  He has never enjoyed lunch, but has a snack around that time of day. He is not eating dinner. Most of the food he is given he no longer recognizes. But he does still have a sweet tooth. Reports that he has been losing weight. In the mornings he likes to walk around the house and walk around outside looking for things that they report do not exist.  For example he looks for his truck, car & keys daily. But they are gone. He no longer drives. His wife has a hard time keeping up with him. He is never left alone. Doors are locked from the inside at night and the wife keeps the keys. Daytime wandering sounds like it might be an issue but so far he has never left the house alone. He has never had any interest in the kitchen, so he has never touched the stove. They report that he gets agitated, but has never become combative, particularly after about 4 PM in the afternoon. He does get frustrated looking for things that are not there. He reports that he has had conversations with family members long . No overt hallucinations. No dysphasia. No gait disturbance or falls. He does sometimes have difficulty dressing putting shirts on backwards for example of putting 2 shirts on. He needs help with personal hygiene. He eats independently. Lives at home with his wife.   He no longer recognizes most people. Sometimes he thinks his daughter is somebody else. Still recognizes his wife.  David Ernst  Review of systems  Comprehensive review of systems negative except for as noted above. Past Medical History:   Diagnosis Date    Memory loss     Poor appetite      Past Surgical History:   Procedure Laterality Date    ABDOMEN SURGERY PROC UNLISTED      adhesions    HX PROSTATECTOMY        Family History   Family history unknown: Yes     Social History     Tobacco Use    Smoking status: Former Smoker    Smokeless tobacco: Never Used   Substance Use Topics    Alcohol use: No      Current Outpatient Medications   Medication Sig Dispense Refill    donepeziL (ARICEPT) 10 mg tablet TAKE 1 TABLET BY MOUTH NIGHTLY 90 Tab 0    memantine (Namenda) 10 mg tablet Take 1 Tab by mouth daily. 90 Tab 0    QUEtiapine (SEROquel) 25 mg tablet TAKE 1 TABLET BY MOUTH NIGHTLY 90 Tab 0    atorvastatin (LIPITOR) 40 mg tablet TAKE 1 TABLET BY MOUTH ONCE DAILY 90 Tab 1    multivitamin/iron/folic acid (CENTRUM PO) Take  by mouth.  melatonin 5 mg cap capsule Take 5 mg by mouth nightly.  LORazepam (ATIVAN) 0.5 mg tablet TAKE 1 TABLET BY MOUTH AS NEEDED FOR  AGITATION  . DO NOT EXCEED  DAILY  AMOUNT  0.5MG 30 Tab 4    aspirin delayed-release 81 mg tablet Take 81 mg by mouth. 2 times a week      cyanocobalamin (VITAMIN B-12) 1,000 mcg tablet Take 1,000 mcg by mouth daily.  QUEtiapine (SEROQUEL) 25 mg tablet Take 1 Tab by mouth two (2) times a day. 180 Tab 1        No Known Allergies       Objective:      Visit Vitals  /58 (BP 1 Location: Left arm, BP Patient Position: Sitting)   Pulse 71   Resp 16   Ht 5' 6\" (1.676 m)   Wt 54.9 kg (121 lb)   SpO2 100%   BMI 19.53 kg/m²        General: No distress. Well groomed  Heart: Regular rate and rhythm, bradycardic.   Lungs: Unlabored  Musculoskeletal: Extremities revealed no edema or muscle wasting   Skin: Warm dry skin  Psych: Flat mood and affect Neurologic Exam:     NEUROLOGICAL EXAM:  General: Awake, alert, paucity of speech. Difficult to understand his speech, speaks quietly but no overt dysarthria. Oriented to self, year, city, state, doctors office. CN: PERRL, EOMI without nystagmus  Motor: Normal tone, bulk and strength bilaterally. No pronator drift. No involuntary movements. Reflexes: 1/4 and symmetric  Coordination: FNF intact. Sensation: LT intact throughout. Gait: Normal based     MMSE  No flowsheet data found. PHQ  3 most recent PHQ Screens 6/25/2020   Little interest or pleasure in doing things Not at all   Feeling down, depressed, irritable, or hopeless Not at all   Total Score PHQ 2 0       Lab Results   Component Value Date/Time    WBC 7.3 04/27/2015 01:31 AM    HCT 36.4 (L) 04/27/2015 01:31 AM    HGB 12.1 04/27/2015 01:31 AM    PLATELET 169 54/17/6760 01:31 AM       Lab Results   Component Value Date/Time    Sodium 132 (L) 04/27/2015 01:31 AM    Potassium 3.6 04/27/2015 01:31 AM    Chloride 99 04/27/2015 01:31 AM    CO2 22 04/27/2015 01:31 AM    Glucose 111 (H) 04/27/2015 01:31 AM    BUN 16 04/27/2015 01:31 AM    Creatinine 0.93 04/27/2015 01:31 AM    Calcium 8.5 04/27/2015 01:31 AM       No components found for: TROPQUANT    No results found for: OBED    No results found for: HBA1C, HGBE8, WTR5DABU, OMP2RSJO, AJV7JVLU     Lab Results   Component Value Date/Time    Vitamin B12 152 (L) 06/28/2016 12:00 AM       No results found for: OBED, ANARX, ANAIGG, XBANA    Lab Results   Component Value Date/Time    Cholesterol, total 182 10/06/2016 10:27 AM    HDL Cholesterol 43 10/06/2016 10:27 AM    LDL, calculated 115 (H) 10/06/2016 10:27 AM    VLDL, calculated 24 10/06/2016 10:27 AM    Triglyceride 121 10/06/2016 10:27 AM       XR Results   Results from [place] encounter on 04/27/15   XR CHEST PA LAT       CT Results:  No results found for this or any previous visit.     MRI Results:  Results from [facility] encounter on 07/14/16   MRI BRAIN WO CONT    Narrative **Final Report**       ICD Codes / Adm. Diagnosis: 331.0  294.10 / Alzheimer's disease  Dementia in   conditions classif  Examination:  MR BRAIN WO CON  - [id number] - Jul 14 2016  6:09PM  Accession No:  [id number]  Reason:  dementia      REPORT:  EXAM:  MR BRAIN WO CON    INDICATION:  Progressing short-term memory loss over the past 8 months. Tobacco use. COMPARISON: None. TECHNIQUE: Sagittal T1; coronal T2; axial T1, T2, FLAIR, gradient-echo, and   diffusion weighted noncontrast MRI of the brain. FINDINGS: Moderate cerebral volume loss for age. No hydrocephalus. No mass   effect or midline shift. No extra-axial fluid collection. Volume loss and hyperintense T2 signal in the left occipital lobe indicates   old infarct. There is ex vacuo dilatation of the occipital horn of the left   lateral ventricle. Moderate volume of hyperintense T2/FLAIR signal in the cerebral white matter   is predominantly periventricular in the frontal and parietal lobes. No   restricted diffusion to indicate acute infarct. No blood flow in the left vertebral artery. Right vertebral artery and   basilar artery flow voids are patent. Anterior circulation flow-voids are   patent. Medial temporal lobes are symmetric. The midline structures, including the   cervicomedullary junction, are within normal limits. IMPRESSION:    1. Volume loss and chronic microvascular ischemic disease. 2. Old left occipital infarct. 3. No acute infarct or mass effect. 4. Evidence of left vertebral artery occlusion. Signing/Reading Doctor: Huber Hoffmann ([id number])    Approved: Huber Hoffmann ([id number])  Jul 15 2016  8:58AM                                      VAS/US Results (maximum last 3): No results found for this or any previous visit.     TTE         EKG  Results for orders placed or performed during the hospital encounter of 06/13/19   EKG, 12 LEAD, INITIAL   Result Value Ref Range    Ventricular Rate 55 BPM    Atrial Rate 55 BPM    P-R Interval 200 ms    QRS Duration 122 ms    Q-T Interval 472 ms    QTC Calculation (Bezet) 451 ms    Calculated P Axis 78 degrees    Calculated R Axis -3 degrees    Calculated T Axis -147 degrees    Diagnosis       Sinus bradycardia  Right bundle branch block  Left ventricular hypertrophy with QRS widening and repolarization abnormality  When compared with ECG of 27-APR-2015 01:37,  No significant change was found  Confirmed by Charles Dominguez MD, Xiomara Yamileth ([id number]) on 6/14/2019 7:42:20 AM             Assessment/Plan:   Simón Small is a 67 y.o. male with recently diagnosed lung CA here for f/u regarding progressive cognitive impairment. Family reporting ongoing slow decline in cognition with severe recall deficits, disorientation, frequent frustration and mild agitation at times. Agitation is improved with with Seroquel. Appetite reduced and losing weight. Discussed that his cancer may be contributing to his reduced appetite and loss of weight. We will be seeing a cancer specialist this week. Recommended they ask that provider whether his cancer is contributory. We discussed progression of his dementia. Briefly that his confusion and disorientation will get worse, his frustration and agitation may get worse, he may develop hallucinations, he may get reduced appetite, and he may become combative. But we will do our best to manage these problems as they come up. Rest of the plan is below. Change Seroquel to 12.5/12.5/25 for agitation and sleep    Family may trial changing dose times and make dose adjustments to find what works best for the patient and will report back to me if a new prescription is needed.  Referral to [employer] [facility] social worker.  Work on getting an organized daily schedule to include scheduled cognitive and social engagement.  Keep the house bright until an hour before bedtime.  Work on getting him to eat at dinnertime. Avoid losing weight even if he has to eat less healthy things.  Continue Aricept 10mg/d, Namenda 10mg qhs   Continue ASA 81mg, Lipitor for stroke prevention   Cont. B12 supplementation   Continue to abstain from driving        [id number] ICD-9-CM    1. Dementia with behavioral disturbance, unspecified dementia type ([facility] F03.91 294.21    2. Remote history of stroke Z86.73 V12.54    3. Mixed Alzheimer's and vascular dementia ([facility] G30.9 331.0     F01.50 294.10     F02.80 290.40      -  More than 50% of this visit was spent counseling the patient and family as described above. - This note will not be viewable in [address]        Signed By: José Luis Smith NP     June 25, 2020 2:37 PM

## 2020-06-25 NOTE — PATIENT INSTRUCTIONS
It was a pleasure to meet you will today, 
 
I am going to refer you to Mary Maier,  with the [employer]. She will be a very useful resource for you. The quetiapine 25 mg twice a day. Let us change it to 3 times a day, with half a tablet with breakfast, half a tablet around 2 PM, and 1 tablet at bedtime. You may also try 1-1/2 or 2 tabs at bedtime only. Or you can try 1 tablet in the morning half a tablet in the afternoon and 1 tablet at bedtime. You know him best, so you will see what works best for him. The goal is to smooth out his behavior without having to make him go to sleep. This should hopefully reduce agitation and his searching for things that are not there. And also his late afternoon sundowning. Let me know what dose works best so we can update the prescription. At this point, we do not want him to lose weight. I know he has become a more picky eater. So if you have to, just let them have pudding or something like that for dinner if he is not can eat anything else. People with dementia do much better with a routine. See if you can keep a schedule that repeats every day, that will help him. Also keep the house bright inside until an hour or so before bedtime. This reduces disorientation in the evening Please follow-up in 6 months. Let me know how he does with medication adjustments and if you need a new prescription. Please call with any concerns or questions.  
 
All the best,  
 
Malissa Wiggins NP

## 2020-07-08 RX ORDER — ATORVASTATIN CALCIUM 40 MG/1
TABLET, FILM COATED ORAL
Qty: 90 TAB | Refills: 1 | Status: SHIPPED | OUTPATIENT
Start: 2020-07-08 | End: 2021-01-07 | Stop reason: SDUPTHER

## 2020-08-31 ENCOUNTER — HOSPITAL ENCOUNTER (EMERGENCY)
Age: 80
Discharge: HOME | End: 2020-08-31
Attending: EMERGENCY MEDICINE
Payer: MEDICARE

## 2020-08-31 VITALS
OXYGEN SATURATION: 100 % | DIASTOLIC BLOOD PRESSURE: 65 MMHG | HEIGHT: 65 IN | TEMPERATURE: 98.4 F | SYSTOLIC BLOOD PRESSURE: 126 MMHG | HEART RATE: 83 BPM | RESPIRATION RATE: 18 BRPM | BODY MASS INDEX: 19.47 KG/M2 | WEIGHT: 116.84 LBS

## 2020-08-31 DIAGNOSIS — T83.9XXA PROBLEM WITH FOLEY CATHETER, INITIAL ENCOUNTER (HCC): Primary | ICD-10-CM

## 2020-08-31 PROCEDURE — 99283 EMERGENCY DEPT VISIT LOW MDM: CPT

## 2020-08-31 PROCEDURE — 77030012865 HC BG URIN LEG MDII -A

## 2020-08-31 PROCEDURE — 51702 INSERT TEMP BLADDER CATH: CPT

## 2020-08-31 NOTE — ED PROVIDER NOTES
EMERGENCY DEPARTMENT HISTORY AND PHYSICAL EXAM          Date: 8/31/2020  Patient Name: Lamar Ball  Attending of Record: Nikolai Rizo DO    History of Presenting Illness     Chief Complaint   Patient presents with    Urinary Catheter Problem     Patients wife states the patient recently had a urinary catheter placed for UTI and retention. Pt has hx of dementia and has been pulling at his catheter, and has now caused the catheter site to become irritated, and now leaking. History Provided By: Patient    HPI: Lamar Ball is a 79 y.o. male, pmhx dementia, who presents to the ED c/o Urinary catheter leakage. He and his wife state he had a jules catheter placed on Wednesday, 8/26/20 for hematuria at a hospital on [facility].  He was also found to have a UTI and has been being treated for Bactrim. Recently, his wife notes he has been having leakage or urine from around the catheter at the urethral meatus. His urine remains tea colored but he denies symptoms of infection or pain. They have not tried anything to alleviate the problem. It is not made worse by anything. He has an appointment with a urologist on Wednesday, 9/2/20 but present today as the leakage has become troubling. PCP: Gloria Wright, NP    There are no other complaints, changes, or physical findings at this time. Current Outpatient Medications   Medication Sig Dispense Refill    atorvastatin (LIPITOR) 40 mg tablet TAKE 1 TABLET BY MOUTH ONCE DAILY 90 Tab 1    QUEtiapine (SEROquel) 25 mg tablet Take 1 Tab by mouth three (3) times daily. 1/2 tab with breakfast, 1/2 tab approx 2 pm, 1 tab before bed  Indications: behavior 180 Tab 1    donepeziL (ARICEPT) 10 mg tablet TAKE 1 TABLET BY MOUTH NIGHTLY 90 Tab 0    memantine (Namenda) 10 mg tablet Take 1 Tab by mouth daily. 90 Tab 0    multivitamin/iron/folic acid (CENTRUM PO) Take  by mouth.  melatonin 5 mg cap capsule Take 5 mg by mouth nightly.       LORazepam (ATIVAN) 0.5 mg tablet TAKE 1 TABLET BY MOUTH AS NEEDED FOR  AGITATION  . DO NOT EXCEED  DAILY  AMOUNT  0.5MG 30 Tab 4    aspirin delayed-release 81 mg tablet Take 81 mg by mouth. 2 times a week      cyanocobalamin (VITAMIN B-12) 1,000 mcg tablet Take 1,000 mcg by mouth daily. Past History     Past Medical History:  Past Medical History:   Diagnosis Date    Memory loss     Poor appetite        Past Surgical History:  Past Surgical History:   Procedure Laterality Date    ABDOMEN SURGERY PROC UNLISTED      adhesions    HX PROSTATECTOMY         Family History:  Family History   Family history unknown: Yes       Social History:  Social History     Tobacco Use    Smoking status: Former Smoker    Smokeless tobacco: Never Used   Substance Use Topics    Alcohol use: No    Drug use: No       Allergies:  No Known Allergies      Review of Systems   Review of Systems   Constitutional: Negative for chills and fever. HENT: Negative for congestion and sore throat. Eyes: Negative for visual disturbance. Respiratory: Negative for cough and shortness of breath. Cardiovascular: Negative for chest pain and leg swelling. Gastrointestinal: Negative for abdominal pain, blood in stool, diarrhea and nausea. Endocrine: Negative for polyuria. Genitourinary: Positive for discharge and hematuria. Negative for dysuria, penile pain, penile swelling, scrotal swelling and testicular pain. Musculoskeletal: Negative for arthralgias, joint swelling and myalgias. Skin: Negative for rash. Allergic/Immunologic: Negative for immunocompromised state. Neurological: Negative for weakness and headaches. Hematological: Does not bruise/bleed easily. Psychiatric/Behavioral: Positive for confusion. Physical Exam   Physical Exam  Constitutional:       Comments: Oriented to person and place only, slow to respond, baseline per wife     HENT:      Head: Normocephalic.       Nose: Nose normal.      Mouth/Throat:      Mouth: Mucous membranes are moist.   Eyes:      Pupils: Pupils are equal, round, and reactive to light. Cardiovascular:      Rate and Rhythm: Normal rate and regular rhythm. Heart sounds: Normal heart sounds. Pulmonary:      Effort: Pulmonary effort is normal.      Breath sounds: Normal breath sounds. Abdominal:      General: Abdomen is flat. Bowel sounds are normal. There is no distension. Palpations: Abdomen is soft. Genitourinary:     Penis: Normal.       Comments: Catheter in place, hematuria noted around urethral meatus, no pustular drainage or erythema or signs of trauma  Musculoskeletal: Normal range of motion. Right lower leg: No edema. Left lower leg: No edema. Skin:     General: Skin is warm. Neurological:      General: No focal deficit present. Mental Status: Mental status is at baseline. Psychiatric:         Mood and Affect: Mood normal.        Diagnostic Study Results     Labs -   No results found for this or any previous visit (from the past 12 hour(s)). Radiologic Studies -   No orders to display     CT Results  (Last 48 hours)    None        CXR Results  (Last 48 hours)    None            Medical Decision Making   I am the first provider for this patient. I reviewed the vital signs, available nursing notes, past medical history, past surgical history, family history and social history. Vital Signs-Reviewed the patient's vital signs. Patient Vitals for the past 12 hrs:   Temp Pulse Resp BP SpO2   08/31/20 0816 98.3 °F (36.8 °C) 85 18 127/62 100 %       ECG Interpretation: Not indicated    Records Reviewed: Nursing Notes and Old Medical Records    Provider Notes (Medical Decision Making):   DDx: Hematuria, jules catheter malfunction, dementia    70yo male with history of dementia presenting with jules catheter problems. Vitals are stable. PE shows jules catheter in place with hematuria around urethral meatus, no signs of infection of trauma.   Patient on bactrim and has follow up appt with urology in two days. Will replace jules catheter and reassess decrease of leakage. ED Course and Progress Notes:   Initial assessment performed. The patients presenting problems have been discussed, and they are in agreement with the care plan formulated and outlined with them. I have encouraged them to ask questions as they arise throughout their visit. ED Course as of Aug 31 1503   Mon Aug 31, 2020   9328 Jules placed, clots likely in catheter, will flush. [JS]   1350 Patient has a 25 [address] Jules in. Attempts at irrigation are unsuccessful, passing hard small clots. Nurse reports that she flushed in 600 and only got 100 back    [AR]   [phone] Was reevaluated, Jules catheter flushed with approximately 100 cc of normal saline with clear urine returned. Ultrasound showed empty bladder with no significant burden. Given patency and adequacy of Jules catheter will discharge with urology follow-up. [JS]      ED Course User Index  [AR] Srikanth Alvarado DO  [JS] Russel Marie MD         Diagnosis     Clinical Impression:   1. Problem with Jules catheter, initial encounter [facility])        Disposition:  Discharge home    DISCHARGE PLAN:    1. Discharge Medication List as of 8/31/2020 10:48 AM        2. Follow-up Information     Follow up With Specialties Details Why [address] [facility]  In 2 days  [address]  [place] [address]    [facility] EMERGENCY DEPT Emergency Medicine  If symptoms worsen [address]  [address] [facility]  [phone]        3.  Return to ED if worse         Valencia Lee MD PGY-2

## 2020-08-31 NOTE — ED NOTES
Pt arrives to the ED with a c/c of urine leaking out of the Uriarte. As per wife at bedside pt has dementia and has been pulling on his existing catheter causing it to leak. Pt is additionally c/o penile discomfort, old clots noted. Pt is noted in stable condition, now in ED room with side rail up, bed to lowest position and call light within reach. Will continue to monitor and wait for EDP evaluation.

## 2020-08-31 NOTE — ED NOTES
Existing Uriarte removed. Attempted Uriarte reinsertion three times, 18 G and 20 G and 22 G. 22 G insertion successful flushed with 500 cc and 100 cc return noted. EDP notified.

## 2020-08-31 NOTE — ED NOTES
Uriarte to leg bag. Discharge instructions provided to pt's wife, she verbalized understanding and has no further questions at this time. Wheelchair offered, pt's wife refused. Pt leaving ED in company of family in stable condition.

## 2020-11-12 RX ORDER — DONEPEZIL HYDROCHLORIDE 10 MG/1
TABLET, FILM COATED ORAL
Qty: 90 TAB | Refills: 0 | Status: SHIPPED | OUTPATIENT
Start: 2020-11-12 | End: 2021-02-18 | Stop reason: SDUPTHER

## 2021-01-06 ENCOUNTER — DOCUMENTATION ONLY (OUTPATIENT)
Dept: NEUROLOGY | Age: 81
End: 2021-01-06

## 2021-01-07 ENCOUNTER — DOCUMENTATION ONLY (OUTPATIENT)
Dept: NEUROLOGY | Age: 81
End: 2021-01-07

## 2021-01-07 ENCOUNTER — HOME HEALTH ADMISSION (OUTPATIENT)
Dept: HOME HEALTH SERVICES | Facility: HOME HEALTH | Age: 81
End: 2021-01-07

## 2021-01-07 RX ORDER — ATORVASTATIN CALCIUM 40 MG/1
TABLET, FILM COATED ORAL
Qty: 90 TAB | Refills: 1 | Status: SHIPPED | OUTPATIENT
Start: 2021-01-07 | End: 2021-05-16

## 2021-01-12 ENCOUNTER — TELEPHONE (OUTPATIENT)
Dept: NEUROLOGY | Age: 81
End: 2021-01-12

## 2021-01-12 NOTE — TELEPHONE ENCOUNTER
Sent James Rashid alzheimers  information regarding reaching out to the patient and his daughter regarding his diagnosis.

## 2021-02-02 ENCOUNTER — TELEPHONE (OUTPATIENT)
Dept: NEUROLOGY | Age: 81
End: 2021-02-02

## 2021-02-02 NOTE — TELEPHONE ENCOUNTER
I informed patient's daughter paperwork was faxed this AM and confirmation was received. Kaela Bryannapatricia, patient's daughter would like to know \"if it is normal for him to have receeding veins in his forehead? \" I advised this was probably due to weight loss. Anything more I need to advise?  Thanks

## 2021-02-02 NOTE — TELEPHONE ENCOUNTER
----- Message from John Kendrick sent at 2/2/2021  8:51 AM EST -----  Regarding: Estephania Howe, SOBEIDA/ Telephone  General Message/Vendor Calls    Caller's first and last name:      Reason for call: requesting a status of [facility] paper work      Callback required yes/no and why: yes       Best contact number(s):  [phone]       Details to clarify the request:  daughter of patient faxed over [facility] paper work on Jan 21st and is requesting a status.  She is re-faxing it today just in case it was not received because it is due on Feb. 4th      Connie Narayanan

## 2021-02-18 ENCOUNTER — TELEPHONE (OUTPATIENT)
Dept: NEUROLOGY | Age: 81
End: 2021-02-18

## 2021-02-18 RX ORDER — DONEPEZIL HYDROCHLORIDE 10 MG/1
TABLET, FILM COATED ORAL
Qty: 90 TAB | Refills: 1 | Status: SHIPPED | OUTPATIENT
Start: 2021-02-18 | End: 2021-05-16

## 2021-02-18 NOTE — TELEPHONE ENCOUNTER
----- Message from Em Rubio sent at 2/18/2021 11:06 AM EST -----  Regarding: Dr. Jarrod Gleason  Medication Refill    Caller (if not patient): Armaan Astudillo       Relationship of caller (if not patient): daughter      Best contact number(s): [phone]      Name of medication and dosage if known: donepeziL (ARICEPT) 10 mg tablet       Is patient out of this medication (yes/no): Yes      Pharmacy name: [address] listed in chart? (yes/no): Yes  Pharmacy phone number: [phone]      Details to clarify the request: Pt's daughter is calling requesting a refill for his Donepezil. Please advise.       Em Rubio

## 2021-05-13 ENCOUNTER — APPOINTMENT (OUTPATIENT)
Dept: GENERAL RADIOLOGY | Age: 81
DRG: 689 | End: 2021-05-13
Attending: EMERGENCY MEDICINE
Payer: MEDICARE

## 2021-05-13 ENCOUNTER — HOSPITAL ENCOUNTER (INPATIENT)
Age: 81
LOS: 1 days | Discharge: HOSPICE | DRG: 689 | End: 2021-05-16
Attending: EMERGENCY MEDICINE | Admitting: INTERNAL MEDICINE
Payer: MEDICARE

## 2021-05-13 ENCOUNTER — HOSPICE ADMISSION (OUTPATIENT)
Dept: HOSPICE | Facility: HOSPICE | Age: 81
End: 2021-05-13

## 2021-05-13 DIAGNOSIS — R62.7 FAILURE TO THRIVE IN ADULT: ICD-10-CM

## 2021-05-13 DIAGNOSIS — E83.52 HYPERCALCEMIA: ICD-10-CM

## 2021-05-13 DIAGNOSIS — E86.0 DEHYDRATION: Primary | ICD-10-CM

## 2021-05-13 PROBLEM — C34.90 LUNG CANCER (HCC): Status: ACTIVE | Noted: 2021-05-13

## 2021-05-13 LAB
ALBUMIN SERPL-MCNC: 2.3 G/DL (ref 3.5–5)
ALBUMIN/GLOB SERPL: 0.4 {RATIO} (ref 1.1–2.2)
ALP SERPL-CCNC: 112 U/L (ref 45–117)
ALT SERPL-CCNC: 16 U/L (ref 12–78)
ANION GAP SERPL CALC-SCNC: 4 MMOL/L (ref 5–15)
APPEARANCE UR: ABNORMAL
AST SERPL-CCNC: 24 U/L (ref 15–37)
BACTERIA URNS QL MICRO: ABNORMAL /HPF
BASOPHILS # BLD: 0.1 K/UL (ref 0–0.1)
BASOPHILS NFR BLD: 0 % (ref 0–1)
BILIRUB SERPL-MCNC: 0.5 MG/DL (ref 0.2–1)
BILIRUB UR QL: NEGATIVE
BUN SERPL-MCNC: 16 MG/DL (ref 6–20)
BUN/CREAT SERPL: 18 (ref 12–20)
CALCIUM SERPL-MCNC: 10.9 MG/DL (ref 8.5–10.1)
CHLORIDE SERPL-SCNC: 101 MMOL/L (ref 97–108)
CO2 SERPL-SCNC: 31 MMOL/L (ref 21–32)
COLOR UR: ABNORMAL
CREAT SERPL-MCNC: 0.87 MG/DL (ref 0.7–1.3)
DIFFERENTIAL METHOD BLD: ABNORMAL
EOSINOPHIL # BLD: 0 K/UL (ref 0–0.4)
EOSINOPHIL NFR BLD: 0 % (ref 0–7)
EPITH CASTS URNS QL MICRO: ABNORMAL /LPF
ERYTHROCYTE [DISTWIDTH] IN BLOOD BY AUTOMATED COUNT: 15.2 % (ref 11.5–14.5)
GLOBULIN SER CALC-MCNC: 6.5 G/DL (ref 2–4)
GLUCOSE SERPL-MCNC: 99 MG/DL (ref 65–100)
GLUCOSE UR STRIP.AUTO-MCNC: NEGATIVE MG/DL
HCT VFR BLD AUTO: 32.5 % (ref 36.6–50.3)
HGB BLD-MCNC: 10.2 G/DL (ref 12.1–17)
HGB UR QL STRIP: ABNORMAL
IMM GRANULOCYTES # BLD AUTO: 0.1 K/UL (ref 0–0.04)
IMM GRANULOCYTES NFR BLD AUTO: 1 % (ref 0–0.5)
KETONES UR QL STRIP.AUTO: ABNORMAL MG/DL
LEUKOCYTE ESTERASE UR QL STRIP.AUTO: ABNORMAL
LYMPHOCYTES # BLD: 1.5 K/UL (ref 0.8–3.5)
LYMPHOCYTES NFR BLD: 9 % (ref 12–49)
MAGNESIUM SERPL-MCNC: 2.2 MG/DL (ref 1.6–2.4)
MCH RBC QN AUTO: 28.7 PG (ref 26–34)
MCHC RBC AUTO-ENTMCNC: 31.4 G/DL (ref 30–36.5)
MCV RBC AUTO: 91.5 FL (ref 80–99)
MONOCYTES # BLD: 1.3 K/UL (ref 0–1)
MONOCYTES NFR BLD: 8 % (ref 5–13)
NEUTS SEG # BLD: 13.8 K/UL (ref 1.8–8)
NEUTS SEG NFR BLD: 82 % (ref 32–75)
NITRITE UR QL STRIP.AUTO: POSITIVE
NRBC # BLD: 0 K/UL (ref 0–0.01)
NRBC BLD-RTO: 0 PER 100 WBC
PH UR STRIP: 6 [PH] (ref 5–8)
PLATELET # BLD AUTO: 414 K/UL (ref 150–400)
PMV BLD AUTO: 9.5 FL (ref 8.9–12.9)
POTASSIUM SERPL-SCNC: 4.8 MMOL/L (ref 3.5–5.1)
PROT SERPL-MCNC: 8.8 G/DL (ref 6.4–8.2)
PROT UR STRIP-MCNC: 30 MG/DL
RBC # BLD AUTO: 3.55 M/UL (ref 4.1–5.7)
RBC #/AREA URNS HPF: ABNORMAL /HPF (ref 0–5)
SODIUM SERPL-SCNC: 136 MMOL/L (ref 136–145)
SP GR UR REFRACTOMETRY: 1.02 (ref 1–1.03)
UROBILINOGEN UR QL STRIP.AUTO: 1 EU/DL (ref 0.2–1)
WBC # BLD AUTO: 16.7 K/UL (ref 4.1–11.1)
WBC URNS QL MICRO: ABNORMAL /HPF (ref 0–4)

## 2021-05-13 PROCEDURE — 74011250636 HC RX REV CODE- 250/636: Performed by: INTERNAL MEDICINE

## 2021-05-13 PROCEDURE — 83735 ASSAY OF MAGNESIUM: CPT

## 2021-05-13 PROCEDURE — 71045 X-RAY EXAM CHEST 1 VIEW: CPT

## 2021-05-13 PROCEDURE — 80053 COMPREHEN METABOLIC PANEL: CPT

## 2021-05-13 PROCEDURE — 85025 COMPLETE CBC W/AUTO DIFF WBC: CPT

## 2021-05-13 PROCEDURE — 99285 EMERGENCY DEPT VISIT HI MDM: CPT

## 2021-05-13 PROCEDURE — 36415 COLL VENOUS BLD VENIPUNCTURE: CPT

## 2021-05-13 PROCEDURE — 99218 HC RM OBSERVATION: CPT

## 2021-05-13 PROCEDURE — 96374 THER/PROPH/DIAG INJ IV PUSH: CPT

## 2021-05-13 PROCEDURE — 81001 URINALYSIS AUTO W/SCOPE: CPT

## 2021-05-13 PROCEDURE — 74011250636 HC RX REV CODE- 250/636: Performed by: EMERGENCY MEDICINE

## 2021-05-13 RX ORDER — ATORVASTATIN CALCIUM 40 MG/1
40 TABLET, FILM COATED ORAL DAILY
Status: DISCONTINUED | OUTPATIENT
Start: 2021-05-14 | End: 2021-05-16 | Stop reason: HOSPADM

## 2021-05-13 RX ORDER — SODIUM CHLORIDE 0.9 % (FLUSH) 0.9 %
5-40 SYRINGE (ML) INJECTION EVERY 8 HOURS
Status: DISCONTINUED | OUTPATIENT
Start: 2021-05-13 | End: 2021-05-16 | Stop reason: HOSPADM

## 2021-05-13 RX ORDER — LEVOFLOXACIN 5 MG/ML
750 INJECTION, SOLUTION INTRAVENOUS EVERY 24 HOURS
Status: DISCONTINUED | OUTPATIENT
Start: 2021-05-13 | End: 2021-05-15

## 2021-05-13 RX ORDER — PROMETHAZINE HYDROCHLORIDE 25 MG/1
12.5 TABLET ORAL
Status: DISCONTINUED | OUTPATIENT
Start: 2021-05-13 | End: 2021-05-16 | Stop reason: HOSPADM

## 2021-05-13 RX ORDER — POLYETHYLENE GLYCOL 3350 17 G/17G
17 POWDER, FOR SOLUTION ORAL DAILY PRN
Status: DISCONTINUED | OUTPATIENT
Start: 2021-05-13 | End: 2021-05-16 | Stop reason: HOSPADM

## 2021-05-13 RX ORDER — ASPIRIN 81 MG/1
81 TABLET ORAL DAILY
Status: DISCONTINUED | OUTPATIENT
Start: 2021-05-14 | End: 2021-05-16 | Stop reason: HOSPADM

## 2021-05-13 RX ORDER — DONEPEZIL HYDROCHLORIDE 5 MG/1
10 TABLET, FILM COATED ORAL
Status: DISCONTINUED | OUTPATIENT
Start: 2021-05-13 | End: 2021-05-16 | Stop reason: HOSPADM

## 2021-05-13 RX ORDER — MEMANTINE HYDROCHLORIDE 10 MG/1
10 TABLET ORAL DAILY
Status: DISCONTINUED | OUTPATIENT
Start: 2021-05-14 | End: 2021-05-16 | Stop reason: HOSPADM

## 2021-05-13 RX ORDER — ACETAMINOPHEN 325 MG/1
650 TABLET ORAL
Status: DISCONTINUED | OUTPATIENT
Start: 2021-05-13 | End: 2021-05-16 | Stop reason: HOSPADM

## 2021-05-13 RX ORDER — ACETAMINOPHEN 650 MG/1
650 SUPPOSITORY RECTAL
Status: DISCONTINUED | OUTPATIENT
Start: 2021-05-13 | End: 2021-05-16 | Stop reason: HOSPADM

## 2021-05-13 RX ORDER — SODIUM CHLORIDE 9 MG/ML
125 INJECTION, SOLUTION INTRAVENOUS CONTINUOUS
Status: DISCONTINUED | OUTPATIENT
Start: 2021-05-13 | End: 2021-05-16 | Stop reason: HOSPADM

## 2021-05-13 RX ORDER — ONDANSETRON 2 MG/ML
4 INJECTION INTRAMUSCULAR; INTRAVENOUS
Status: DISCONTINUED | OUTPATIENT
Start: 2021-05-13 | End: 2021-05-16 | Stop reason: HOSPADM

## 2021-05-13 RX ORDER — QUETIAPINE FUMARATE 25 MG/1
25 TABLET, FILM COATED ORAL 3 TIMES DAILY
Status: DISCONTINUED | OUTPATIENT
Start: 2021-05-13 | End: 2021-05-16 | Stop reason: HOSPADM

## 2021-05-13 RX ORDER — SODIUM CHLORIDE 0.9 % (FLUSH) 0.9 %
5-40 SYRINGE (ML) INJECTION AS NEEDED
Status: DISCONTINUED | OUTPATIENT
Start: 2021-05-13 | End: 2021-05-16 | Stop reason: HOSPADM

## 2021-05-13 RX ADMIN — Medication 10 ML: at 22:19

## 2021-05-13 RX ADMIN — LEVOFLOXACIN 750 MG: 5 INJECTION, SOLUTION INTRAVENOUS at 22:19

## 2021-05-13 RX ADMIN — SODIUM CHLORIDE 125 ML/HR: 9 INJECTION, SOLUTION INTRAVENOUS at 22:19

## 2021-05-13 RX ADMIN — SODIUM CHLORIDE 1000 ML: 9 INJECTION, SOLUTION INTRAVENOUS at 18:11

## 2021-05-13 NOTE — ED PROVIDER NOTES
EMERGENCY DEPARTMENT HISTORY AND PHYSICAL EXAM      Date: 5/13/2021  Patient Name: Compa Tucker    History of Presenting Illness     Chief Complaint   Patient presents with    Altered mental status     arrives with EMS after family called for change in mental status, decreased mobility; pmh dementia, lung CA (not being treated). patient is A & O x1       History Provided By: Patient    HPI: Compa Tucker, 79 y.o. male with PMHx as noted below presents the emergency department for evaluation of altered mental status. History is limited secondary to patient's dementia. Per report, patient has been less active than usual so family wanted to bring him to get checked out. No further history is available on patient's arrival.      Discussed further with wife, notes that over the last week patient has become more lethargic, unable to get out of bed and has not been eating or drinking.         PCP: Cristal Romano MD    Current Facility-Administered Medications   Medication Dose Route Frequency Provider Last Rate Last Admin    sodium chloride 0.9 % bolus infusion 1,000 mL  1,000 mL IntraVENous NOW Flora Granados MD        aspirin delayed-release tablet 81 mg  81 mg Oral DAILY Ivy Hernandez MD        atorvastatin (LIPITOR) tablet 40 mg  40 mg Oral DAILY Flora Granados MD        [Held by provider] donepeziL (ARICEPT) tablet 10 mg  10 mg Oral QHS Flora Granados MD        [facility] tablet 10 mg  10 mg Oral DAILY Flora Granados MD        [Held by provider] QUEtiapine (SEROquel) tablet 25 mg  25 mg Oral TID Flora Granados MD        sodium chloride (NS) flush 5-40 mL  5-40 mL IntraVENous Q8H Belkis KERN MD   10 mL at 05/13/21 2219    sodium chloride (NS) flush 5-40 mL  5-40 mL IntraVENous PRN Flora Granados MD        acetaminophen (TYLENOL) tablet 650 mg  650 mg Oral Q6H PRN Flora Granados MD        Or    acetaminophen (TYLENOL) suppository 650 mg  650 mg Rectal Q6H PRN Flora Granados MD  polyethylene glycol (MIRALAX) packet 17 g  17 g Oral DAILY PRN Sabine Saunders MD        promethazine (PHENERGAN) tablet 12.5 mg  12.5 mg Oral Q6H PRN Sabine Saunders MD        Or    ondansetron [facility]) injection 4 mg  4 mg IntraVENous Q6H PRN Sabine Saunders MD        0.9% sodium chloride infusion  125 mL/hr IntraVENous CONTINUOUS Sabine Saunders  mL/hr at 05/13/21 2219 125 mL/hr at 05/13/21 2219    levoFLOXacin (LEVAQUIN) 750 mg in D5W IVPB  750 mg IntraVENous Q24H Sabine Saunders  mL/hr at 05/13/21 2219 750 mg at 05/13/21 2219       Past History     Past Medical History:  Past Medical History:   Diagnosis Date    Memory loss     Poor appetite        Past Surgical History:  Past Surgical History:   Procedure Laterality Date    HX PROSTATECTOMY      MT ABDOMEN SURGERY PROC UNLISTED      adhesions       Family History:  Family History   Family history unknown: Yes       Social History:  Social History     Tobacco Use    Smoking status: Former Smoker    Smokeless tobacco: Never Used   Substance Use Topics    Alcohol use: No    Drug use: No       Allergies:  No Known Allergies      Review of Systems   Review of Systems   Unable to perform ROS: Dementia       Physical Exam   Physical Exam    GENERAL: alert , no acute distress, cachectic, temporal wasting noted  EYES: PEERL, No injection, discharge or icterus. ENT: Mucous membranes pink and moist.  NECK: Supple  LUNGS: Airway patent. Non-labored respirations. Breath sounds clear with good air entry bilaterally. HEART: Regular rate and rhythm. No peripheral edema  ABDOMEN: Non-distended and non-tender, without guarding or rebound.   SKIN:  warm, dry  MSK/EXTREMITIES: Without swelling, tenderness or deformity, symmetric with normal ROM  NEUROLOGICAL: Alert, moving all 4 extremities      Diagnostic Study Results     Labs -     Recent Results (from the past 12 hour(s))   CBC WITH AUTOMATED DIFF    Collection Time: 05/13/21  6:02 PM   Result Value Ref Range    WBC 16.7 (H) 4.1 - 11.1 K/uL    RBC 3.55 (L) 4.10 - 5.70 M/uL    HGB 10.2 (L) 12.1 - 17.0 g/dL    HCT 32.5 (L) 36.6 - 50.3 %    MCV 91.5 80.0 - 99.0 FL    MCH 28.7 26.0 - 34.0 PG    MCHC 31.4 30.0 - 36.5 g/dL    RDW 15.2 (H) 11.5 - 14.5 %    PLATELET 537 (H) 340 - 400 K/uL    MPV 9.5 8.9 - 12.9 FL    NRBC 0.0 0  WBC    ABSOLUTE NRBC 0.00 0.00 - 0.01 K/uL    NEUTROPHILS 82 (H) 32 - 75 %    LYMPHOCYTES 9 (L) 12 - 49 %    MONOCYTES 8 5 - 13 %    EOSINOPHILS 0 0 - 7 %    BASOPHILS 0 0 - 1 %    IMMATURE GRANULOCYTES 1 (H) 0.0 - 0.5 %    ABS. NEUTROPHILS 13.8 (H) 1.8 - 8.0 K/UL    ABS. LYMPHOCYTES 1.5 0.8 - 3.5 K/UL    ABS. MONOCYTES 1.3 (H) 0.0 - 1.0 K/UL    ABS. EOSINOPHILS 0.0 0.0 - 0.4 K/UL    ABS. BASOPHILS 0.1 0.0 - 0.1 K/UL    ABS. IMM. GRANS. 0.1 (H) 0.00 - 0.04 K/UL    DF AUTOMATED     METABOLIC PANEL, COMPREHENSIVE    Collection Time: 05/13/21  6:02 PM   Result Value Ref Range    Sodium 136 136 - 145 mmol/L    Potassium 4.8 3.5 - 5.1 mmol/L    Chloride 101 97 - 108 mmol/L    CO2 31 21 - 32 mmol/L    Anion gap 4 (L) 5 - 15 mmol/L    Glucose 99 65 - 100 mg/dL    BUN 16 6 - 20 MG/DL    Creatinine 0.87 0.70 - 1.30 MG/DL    BUN/Creatinine ratio 18 12 - 20      GFR est AA >60 >60 ml/min/1.73m2    GFR est non-AA >60 >60 ml/min/1.73m2    Calcium 10.9 (H) 8.5 - 10.1 MG/DL    Bilirubin, total 0.5 0.2 - 1.0 MG/DL    ALT (SGPT) 16 12 - 78 U/L    AST (SGOT) 24 15 - 37 U/L    Alk.  phosphatase 112 45 - 117 U/L    Protein, total 8.8 (H) 6.4 - 8.2 g/dL    Albumin 2.3 (L) 3.5 - 5.0 g/dL    Globulin 6.5 (H) 2.0 - 4.0 g/dL    A-G Ratio 0.4 (L) 1.1 - 2.2     MAGNESIUM    Collection Time: 05/13/21  6:02 PM   Result Value Ref Range    Magnesium 2.2 1.6 - 2.4 mg/dL   URINALYSIS W/ RFLX MICROSCOPIC    Collection Time: 05/13/21  6:30 PM   Result Value Ref Range    Color YELLOW/STRAW      Appearance CLOUDY (A) CLEAR      Specific gravity 1.024 1.003 - 1.030      pH (UA) 6.0 5.0 - 8.0      Protein 30 (A) NEG mg/dL Glucose Negative NEG mg/dL    Ketone TRACE (A) NEG mg/dL    Bilirubin Negative NEG      Blood MODERATE (A) NEG      Urobilinogen 1.0 0.2 - 1.0 EU/dL    Nitrites Positive (A) NEG      Leukocyte Esterase TRACE (A) NEG      WBC 5-10 0 - 4 /hpf    RBC 5-10 0 - 5 /hpf    Epithelial cells FEW FEW /lpf    Bacteria 4+ (A) NEG /hpf       Radiologic Studies -   XR CHEST PORT   Final Result   Asymmetric left hilar enlargement with volume loss of left   hemithorax demonstrated in the interval. Obstructing mass lesion suspected. CT Results  (Last 48 hours)    None        CXR Results  (Last 48 hours)               05/13/21 1806  XR CHEST PORT Final result    Impression:  Asymmetric left hilar enlargement with volume loss of left   hemithorax demonstrated in the interval. Obstructing mass lesion suspected. Narrative:  EXAM: XR CHEST PORT       INDICATION: Chest Pain       COMPARISON: 4/27/2015       FINDINGS: A portable AP radiograph of the chest was obtained at 1750 hours. There is interval asymmetric enlargement of the left hilar contours as well as   diminished volume of the left hemithorax. The right lung is clear. No   pneumothorax is shown. There is probably a trace left pleural effusion. Cardiac   size is normal. The bones are moderately osteopenic. Medical Decision Making     I, Orville Horton MD am the first provider for this patient and am the attending of record for this patient encounter. I reviewed the vital signs, available nursing notes, past medical history, past surgical history, family history and social history. Vital Signs-Reviewed the patient's vital signs.   Patient Vitals for the past 12 hrs:   Temp Pulse Resp BP SpO2   05/14/21 0000  87      05/13/21 2345 98.6 °F (37 °C) 83 20 101/60 99 %   05/13/21 2126 98.5 °F (36.9 °C) 87 20 98/65 95 %   05/13/21 2000 98.7 °F (37.1 °C) 83 28 101/63 100 %   05/13/21 1915  84 26 110/66 100 %   05/13/21 1900  88 26 (!) 105/58 99 %   05/13/21 1845  84 27 (!) 108/55 99 %   05/13/21 1830  85 28 (!) 105/56 97 %   05/13/21 1815    (!) 111/59 98 %   05/13/21 1800    109/60 100 %   05/13/21 1745    (!) 110/56 99 %   05/13/21 1730    (!) 103/56 98 %   05/13/21 1715    (!) 103/54 99 %   05/13/21 1612 98.6 °F (37 °C) 95 20 (!) 108/57 97 %       Pulse Oximetry Analysis - 95% on RA    Cardiac Monitor:   Rate: 95 bpm  Rhythm: Normal Sinus Rhythm    The cardiac monitor was ordered secondary to altered mental status  and to monitor the patient for dysrhythmia    Cardiac Monitor:   Rate: 91 bpm  Rhythm: Normal Sinus Rhythm    The cardiac monitor was ordered secondary to altered mental status  and to monitor the patient for dysrhythmia      Records Reviewed: Nursing Notes and Old Medical Records    Provider Notes (Medical Decision Making): On presentation, the patient is chronically ill-appearing presenting for altered mental status, lethargy and failure to thrive. Differential was broad and included sepsis, dehydration, electrolyte abnormality, metabolic abnormality, renal failure. Labs notable for leukocytosis, hypercalcemia, possible urinary tract infection. Patient given IV fluids. Discussed goals of care in depth with the patient's wife and believe that the best route is to pursue hospice care. Will consult with case management. ED Course:   Initial assessment performed. The patients presenting problems have been discussed with the patient's wife, and they are in agreement with the care plan formulated and outlined with them. I have encouraged them to ask questions as they arise throughout their visit.         Medications   sodium chloride 0.9 % bolus infusion 1,000 mL (has no administration in time range)   aspirin delayed-release tablet 81 mg (has no administration in time range)   atorvastatin (LIPITOR) tablet 40 mg (has no administration in time range)   donepeziL (ARICEPT) tablet 10 mg ( Oral Automatically Held 5/27/21 2200)   memantine (NAMENDA) tablet 10 mg (has no administration in time range)   QUEtiapine (SEROquel) tablet 25 mg ( Oral Automatically Held 5/27/21 2200)   sodium chloride (NS) flush 5-40 mL (10 mL IntraVENous Given 5/13/21 2219)   sodium chloride (NS) flush 5-40 mL (has no administration in time range)   acetaminophen (TYLENOL) tablet 650 mg (has no administration in time range)     Or   acetaminophen (TYLENOL) suppository 650 mg (has no administration in time range)   polyethylene glycol (MIRALAX) packet 17 g (has no administration in time range)   promethazine (PHENERGAN) tablet 12.5 mg (has no administration in time range)     Or   ondansetron (ZOFRAN) injection 4 mg (has no administration in time range)   0.9% sodium chloride infusion (125 mL/hr IntraVENous New Bag 5/13/21 2219)   levoFLOXacin (LEVAQUIN) 750 mg in D5W IVPB (750 mg IntraVENous New Bag 5/13/21 2219)   sodium chloride 0.9 % bolus infusion 1,000 mL (1,000 mL IntraVENous New Bag 5/13/21 1811)     Admit Note  Patient is being admitted to the hospitalist..  The results of their tests and reasons for their admission have been discussed with his wife. .  They convey agreement and understanding for the need to be admitted and for their admission diagnosis. Consultation has been made with the inpatient physician specialist for hospitalization. Disposition:  admit    PLAN:  1. Fluids, admit    Diagnosis     Clinical Impression:   1. Dehydration    2. Hypercalcemia    3. Failure to thrive in adult        Please note that this dictation was completed with Dragon, computer voice recognition software. Quite often unanticipated grammatical, syntax, homophones, and other interpretive errors are inadvertently transcribed by the computer software. Please disregard these errors. Additionally, please excuse any errors that have escaped final proofreading.

## 2021-05-13 NOTE — HOSPICE
[address] [facility] in [facility]  [phone]     Patient Name: Percy Last  YOB: 1940  Age: 79 y.o. [facility] RN Note:  Hospice consult received, reviewing chart. Will follow up with Unit Nurse and Care Manager to discuss plan of care, patient status and discharge disposition within the hour. 1726 Received consult from [facility], Liaison as consult has not come through intake department. Verified with our intake department that we are capped in patient's area and would not be able to serve patient. Family is asking for general information session with hospice so I will meet with them in the Ed shortly. CM is aware that if family chooses hospice they will need to select a different company as we have to defer. 1818 Spent time at bedside with wife and patient. Discussed hospice care/philosophy, what hospice care may look like at home with any hospice company. We are capped in the area and unable to take patient, I have notified CM that family does want hospice care for patient at home. He does not meet criteria for inpatient hospice as his symptoms are currently managed. Wife has stated that patient will be staying overnight for observation. I have notified CM and she will offer hospice choice. Wife would like for her daughter to be involved in further hospice conversations so that they are both educated on what to expect moving forward. CM will speak with wife this evening. Thank you for the opportunity to be of service to this patient.     Vidhi Rodriguez, RN  [employer]  [phone] Mobile

## 2021-05-13 NOTE — PROGRESS NOTES
1800: CM acknowledged consult for hospice. Consult placed to hospice via [address]. CM placed call to hospice liaison and left HIPPA compliant voicemail. Consult for hospice information session for potential home hospice care. 1845:Spoke with Hospice liaison who met with patient wife at the bedside. Liaison stated that wife requesting CM to provide list of community hospice agencies. CM met with patient wife at the bedside and provided list of agencies. Pt wife stated that she wanted to review list with daughter and will let CM know which agency to send referral. CM advised that this CM will give a handoff so that another CM can follow up with her tomorrow.  Patient to be admitted under observation per ED MD.     Mitzi Ball, CELSON, RN, [address] [facility]   RN Care Manager

## 2021-05-14 LAB
ANION GAP SERPL CALC-SCNC: 6 MMOL/L (ref 5–15)
BUN SERPL-MCNC: 13 MG/DL (ref 6–20)
BUN/CREAT SERPL: 19 (ref 12–20)
CALCIUM SERPL-MCNC: 9.7 MG/DL (ref 8.5–10.1)
CHLORIDE SERPL-SCNC: 107 MMOL/L (ref 97–108)
CO2 SERPL-SCNC: 25 MMOL/L (ref 21–32)
CREAT SERPL-MCNC: 0.68 MG/DL (ref 0.7–1.3)
ERYTHROCYTE [DISTWIDTH] IN BLOOD BY AUTOMATED COUNT: 15.2 % (ref 11.5–14.5)
GLUCOSE SERPL-MCNC: 72 MG/DL (ref 65–100)
HCT VFR BLD AUTO: 30.2 % (ref 36.6–50.3)
HGB BLD-MCNC: 9.3 G/DL (ref 12.1–17)
MCH RBC QN AUTO: 28.3 PG (ref 26–34)
MCHC RBC AUTO-ENTMCNC: 30.8 G/DL (ref 30–36.5)
MCV RBC AUTO: 91.8 FL (ref 80–99)
NRBC # BLD: 0 K/UL (ref 0–0.01)
NRBC BLD-RTO: 0 PER 100 WBC
PLATELET # BLD AUTO: 364 K/UL (ref 150–400)
PMV BLD AUTO: 9.6 FL (ref 8.9–12.9)
POTASSIUM SERPL-SCNC: 4.1 MMOL/L (ref 3.5–5.1)
RBC # BLD AUTO: 3.29 M/UL (ref 4.1–5.7)
SODIUM SERPL-SCNC: 138 MMOL/L (ref 136–145)
WBC # BLD AUTO: 16.7 K/UL (ref 4.1–11.1)

## 2021-05-14 PROCEDURE — 74011250637 HC RX REV CODE- 250/637: Performed by: INTERNAL MEDICINE

## 2021-05-14 PROCEDURE — 80048 BASIC METABOLIC PNL TOTAL CA: CPT

## 2021-05-14 PROCEDURE — 99218 HC RM OBSERVATION: CPT

## 2021-05-14 PROCEDURE — 74011250636 HC RX REV CODE- 250/636: Performed by: INTERNAL MEDICINE

## 2021-05-14 PROCEDURE — 85027 COMPLETE CBC AUTOMATED: CPT

## 2021-05-14 PROCEDURE — 96376 TX/PRO/DX INJ SAME DRUG ADON: CPT

## 2021-05-14 PROCEDURE — 36415 COLL VENOUS BLD VENIPUNCTURE: CPT

## 2021-05-14 RX ADMIN — LEVOFLOXACIN 750 MG: 5 INJECTION, SOLUTION INTRAVENOUS at 22:05

## 2021-05-14 RX ADMIN — Medication 10 ML: at 22:05

## 2021-05-14 RX ADMIN — ATORVASTATIN CALCIUM 40 MG: 40 TABLET, FILM COATED ORAL at 09:10

## 2021-05-14 RX ADMIN — Medication 10 ML: at 05:59

## 2021-05-14 RX ADMIN — ASPIRIN 81 MG: 81 TABLET, COATED ORAL at 09:10

## 2021-05-14 RX ADMIN — MEMANTINE HYDROCHLORIDE 10 MG: 10 TABLET ORAL at 09:10

## 2021-05-14 NOTE — PROGRESS NOTES
I reviewed pertinent labs and imaging, and discussed /agreed on the plan of care with Dr. Theodore Harman. Hospitalist Progress Note    NAME: Heide Trotter   :  1940   MRN:  [id number]       Assessment / Plan:  Lung cancer  Dehydration  Hypercalcemia  Leukocytosis suspect UTI versus postobstructive pneumonia - cont levaquin   Dementia  Dyslipidemia  -Has a history of lung cancer and elected not to undergo any further work-up or treatment  -Both patient's wife and daughter have decided to transition him to hospice. Want to go to hospice not able to take the patient so they have recommended that we observe the patient to facilitate hospice at home  -Resume home aspirin, Lipitor, Aricept and Namenda  -Resume home Seroquel     Family had not made a decision as to which [address] this morning but are at bedside now . CM to send referral to At [facility] [address] to Hospice in the morning        Body mass index is 16.74 kg/m². Estimated discharge date: May 15  Barriers:    Code status: DNR  Prophylaxis: SCD's  Recommended Disposition: Home w/Family and Hospice      Subjective:     Chief Complaint / Reason for Physician Visit  \"\". Discussed with RN events overnight. Review of Systems:  Symptom Y/N Comments  Symptom Y/N Comments   Fever/Chills    Chest Pain     Poor Appetite    Edema     Cough    Abdominal Pain     Sputum    Joint Pain     SOB/MO    Pruritis/Rash     Nausea/vomit    Tolerating PT/OT     Diarrhea    Tolerating Diet     Constipation    Other       Could NOT obtain due to: Confused      Objective:     VITALS:   Last 24hrs VS reviewed since prior progress note.  Most recent are:  Patient Vitals for the past 24 hrs:   Temp Pulse Resp BP SpO2   21 1145 (!) 96.3 °F (35.7 °C) 96 16 (!) 109/56 99 %   21 0830 97.1 °F (36.2 °C) 96 16 (!) 116/58 99 %   21 0400  93      21 0345 98.5 °F (36.9 °C) (!) 103 18 (!) 114/53 95 %   21 0000  87      21 2345 98.6 °F (37 °C) 83 20 101/60 99 %   05/13/21 2126 98.5 °F (36.9 °C) 87 20 98/65 95 %   05/13/21 2000 98.7 °F (37.1 °C) 83 28 101/63 100 %   05/13/21 1915  84 26 110/66 100 %   05/13/21 1900  88 26 (!) 105/58 99 %   05/13/21 1845  84 27 (!) 108/55 99 %   05/13/21 1830  85 28 (!) 105/56 97 %   05/13/21 1815    (!) 111/59 98 %   05/13/21 1800    109/60 100 %   05/13/21 1745    (!) 110/56 99 %   05/13/21 1730    (!) 103/56 98 %   05/13/21 1715    (!) 103/54 99 %   05/13/21 1612 98.6 °F (37 °C) 95 20 (!) 108/57 97 %       Intake/Output Summary (Last 24 hours) at 5/14/2021 1548  Last data filed at 5/13/2021 2039  Gross per 24 hour   Intake    Output 200 ml   Net -200 ml        I had a face to face encounter and independently examined this patient on 5/14/2021, as outlined below:  PHYSICAL EXAM:  General: Cachetic  Alert, cooperative, no acute distress    EENT:  EOMI. Anicteric sclerae. MMM missing teeth   Resp:   no wheezing or rales. No accessory muscle use  CV:  S1S2,  No edema  GI:  Soft, Non distended, Non tender. +Bowel sounds  Neurologic:  Alert and oriented X 1, normal speech,   Psych:   Good insight. Not anxious nor agitated  Skin:  No rashes. No jaundice    Reviewed most current lab test results and cultures  YES  Reviewed most current radiology test results   YES  Review and summation of old records today    NO  Reviewed patient's current orders and MAR    YES  PMH/SH reviewed - no change compared to H&P  ________________________________________________________________________  Care Plan discussed with:    Comments   Patient x    Family      RN x    Care Manager     Consultant                        Multidiciplinary team rounds were held today with , nursing, pharmacist and clinical coordinator. Patient's plan of care was discussed; medications were reviewed and discharge planning was addressed.      ________________________________________________________________________  Total NON critical care TIME:  30  Minutes    Total CRITICAL CARE TIME Spent:   Minutes non procedure based      Comments   >50% of visit spent in counseling and coordination of care     ________________________________________________________________________  Roderick Lovell. Maribel Campos NP     Procedures: see electronic medical records for all procedures/Xrays and details which were not copied into this note but were reviewed prior to creation of Plan. LABS:  I reviewed today's most current labs and imaging studies. Pertinent labs include:  Recent Labs     05/14/21  0644 05/13/21  1802   WBC 16.7* 16.7*   HGB 9.3* 10.2*   HCT 30.2* 32.5*    414*     Recent Labs     05/14/21 0644 05/13/21  1802    136   K 4.1 4.8    101   CO2 25 31   GLU 72 99   BUN 13 16   CREA 0.68* 0.87   CA 9.7 10.9*   MG  --  2.2   ALB  --  2.3*   TBILI  --  0.5   ALT  --  16       Signed: Sarah Campos NP

## 2021-05-14 NOTE — PROGRESS NOTES
End of Shift Note    Bedside shift change report given to Ramo (oncoming nurse) by Aviva Henry (offgoing nurse). Report included the following information SBAR, Kardex, ED Summary, Intake/Output, MAR, Accordion and Recent Results    Shift worked:  night     Shift summary and any significant changes:    PT stable throughout shift. Scheduled meds given. Concerns for physician to address: Hospice? Zone phone for oncoming shift:       Activity:  Activity Level: Bed Rest  Number times ambulated in hallways past shift: 0  Number of times OOB to chair past shift: 0    Cardiac:   Cardiac Monitoring: Yes      Cardiac Rhythm: Sinus Rhythm    Access:   Current line(s): PIV     Genitourinary:   Urinary status: voiding and incontinent    Respiratory:   O2 Device: None (Room air)  Chronic home O2 use?: NO  Incentive spirometer at bedside: NO     GI:     Current diet:  DIET REGULAR  Passing flatus: YES  Tolerating current diet: YES       Pain Management:   Patient states pain is manageable on current regimen: N/A    Skin:  Tez Score: 12  Interventions: turn team, speciality bed and float heels    Patient Safety:  Fall Score:  Total Score: 3  Interventions: bed/chair alarm and gripper socks  High Fall Risk: Yes    Length of Stay:  Expected LOS: - - -  Actual LOS: 0      Aviva Henry

## 2021-05-14 NOTE — PROGRESS NOTES
Transition of Care Plan:    RUR: N/A - observation  Will need observation notices signed - copies are at bedside to review with family upon arrival to hospital  Disposition: Home with spouse and hospice, awaiting hospice agency decision  Follow up appointments: N/A  DME needed: Pt has no DME at home per wife. Hospice will need to coordinate pt's DME needs prior to d/c home. Transportation at Discharge: Uncertain at this time - will need to further discuss with family  [place] or means to access home: Wife will have access to home       IM Medicare letter: Will need 2nd [facility] letter prior to d/c  Caregiver Contact: Pt's wife, Sonya Giron) [phone]  Discharge Caregiver contacted prior to discharge? This CM talked with pt's wife on today, unit CM to follow. Reason for Admission:  Lung cancer, dehydration, hypercalcemia, leukocytosis suspect UTI versus postobstructive pneumonia, dementia, dyslipidemia                     RUR Score: N/A - observation status                     Plan for utilizing home health: N/A - pt will be discharging home with hospice at d/c. PCP: First and Last name:  Deloris Ordoñez MD     Name of Practice:    Are you a current patient: Yes/No: Yes   Approximate date of last visit: Uncertain of last appt   Can you participate in a virtual visit with your PCP: No                    Current Advanced Directive/Advance Care Plan: DNR   Advance Care Planning     General Advance Care Planning (ACP) Conversation      Date of Conversation: 5/13/2021  Conducted with: Healthcare Decision Maker: Next of Kin by law (only applies in absence of a Healthcare Power of  or Legal Guardian)    Healthcare Decision Maker: Rody Schultz - Spouse, Alberta Clark    Content/Action Overview:   Pt does no have ACP documents on file. Pt has hx of dementia, not appropriate to discuss AMD wiht pt at this time. Pt's spouse, Shelia Vasquez, is legal next of kin.   Reviewed DNR/DNI and patient confirms current DNR status - completed forms on file (place new order if needed)    Length of Voluntary ACP Conversation in minutes:  <16 minutes (Non-Billable)    Sarah Solano                                Transition of Care Plan: Home with spouse with hospice, awaiting hospice agency determination from pt's spouse/daughter. CM reviewed chart. CM completed assessment with pt's wife via phone. CM introduced self/role, verified demographics, and discussed discharge planning. Pt resides with his wife in home with 4 JULIETTE. Pt's wife reports that their daughter, Opal Cooley, lives nearby in [place] Pt's wife met with ED CM on yesterday, and was provided hospice agency list. Pt's wife is looking to transition pt home with hospice services. [facility] met with pt's wife to provide further information on hospice, but is unable to accept [facility] hospice pt at this time due to being at maximum capacity. Pt's wife requested additional time to discuss hospice options with daughter. She states that pt does not have DME at home, any DME needs for pt will need to be coordinated by chosen hospice agency. Pt's wife has been provided with unit CM's information and will be coming to hospital later in the day. I have provided hand off to unit CM. Unit care management will continue to follow for transition of care planning needs. Care Management Interventions  PCP Verified by CM: Yes  Mode of Transport at Discharge:  Other (see comment)(Uncertain at this time, ancitipating need for BLS transport, will need to confirm with family)  Transition of Care Consult (CM Consult): Discharge Planning  Discharge Durable Medical Equipment: No(No DME reported at home)  Physical Therapy Consult: No  Occupational Therapy Consult: No  Speech Therapy Consult: No  Current Support Network: Lives with Spouse, Family Lives Nearby(Pt resides with his spouse, pt's daughter lives nearby and is supportive and involved.)  Confirm Follow Up Transport: Family  The Patient and/or Patient Representative was Provided with a Choice of Provider and Agrees with the Discharge Plan?: Yes  Freedom of Choice List was Provided with Basic Dialogue that Supports the Patient's Individualized Plan of Care/Goals, Treatment Preferences and Shares the Quality Data Associated with the Providers?: Yes   Resource Information Provided?: No  Discharge Location  Discharge Placement: Home with hospice(Awaiting hospice agency decision from pt's wife and daughter)    Amanda Haddad [facility] [address], [address]

## 2021-05-14 NOTE — PROGRESS NOTES
End of Shift Note Bedside shift change report given to [facility] (oncoming nurse) by Everardo Singh RN (offgoing nurse). Report included the following information SBAR, Kardex, MAR, Accordion and Recent Results Shift worked:  9050-3117. Shift summary and any significant changes: No acute changes with this patient. Appetite remains poor- ensure supplements added to meals- per family prefers chocolate. Telemetry monitoring discontinued. Concerns for physician to address:   
  
Zone phone for oncoming shift:    
  
 
Activity: 
Activity Level: Bed Rest 
Number times ambulated in hallways past shift: 0 Number of times OOB to chair past shift: 0 Cardiac:  
Cardiac Monitoring: Yes     
Cardiac Rhythm: Sinus Rhythm Access:  
Current line(s): PIV Genitourinary:  
Urinary status: incontinent Respiratory:  
O2 Device: None (Room air) Chronic home O2 use?: NO Incentive spirometer at bedside: NO 
  
GI: 
  
Current diet:  DIET REGULAR 
DIET NUTRITIONAL SUPPLEMENTS All Meals; Ensure [employer] DIET ONE TIME MESSAGE Passing flatus: YES Tolerating current diet: NO 
  
 
Pain Management:  
Patient states pain is manageable on current regimen: YES Skin: 
Tez Score: 12 Interventions: increase time out of bed Patient Safety: 
Fall Score: Total Score: 3 Interventions: {fall interventions:56179} High Fall Risk: Yes Length of Stay: 
Expected LOS: - - - Actual LOS: 0 Everardo Singh RN

## 2021-05-14 NOTE — H&P
Hospitalist Admission Note    NAME: Davey Neil   :     MRN:  [id number]     Date/Time:  2021 8:15 PM    Patient PCP: Eliecer Eid MD  ________________________________________________________________________    My assessment of this patient's clinical condition and my plan of care is as follows. Assessment / Plan:  Lung cancer  Dehydration  Hypercalcemia  Leukocytosis suspect UTI versus postobstructive pneumonia  Dementia  Dyslipidemia  -Has a history of lung cancer and elected not to undergo any further work-up or treatment  -Both patient's wife and daughter have decided to transition him to hospice. Want to go to hospice not able to take the patient so they have recommended that we observe the patient to facilitate hospice at home  -Resume home aspirin, Lipitor, Aricept and Namenda  -Resume home Seroquel    I spoke to both patient's wife and also daughter and both of them have agreed to transition the patient to home with home hospice. They do want me to treat his UTI and pneumonia to see if that would help with his symptoms and they would also like to continue most of his home medications. Code Status: DNR  Surrogate Decision Maker: Wife Alberta and Dr. Joaquina Godfrey    DVT Prophylaxis: SCDs as he is under hospice  GI Prophylaxis: not indicated    Baseline: From home, lives with wife, poor mobility, did not get out of the bed since last         Subjective:   CHIEF COMPLAINT: Generalized weakness    HISTORY OF PRESENT ILLNESS:     Davey Neil is a 79 y.o.   male who presents with a past medical history of lung cancer, dementia, dyslipidemia is coming the hospital chief complaint of generalized weakness. Patient is currently confused and is not reliable historian also has underlying dementia. Information obtained by talking to patient's wife and also daughter.   According to them, patient last got out of the bed about , was not eating or drinking well and also reported generalized weakness involving both his arms and legs. Does have a history of lung cancer and chose not to undergo any further diagnosis or treatment and family has been respecting his wishes. He reported some cough with small amount of whitish phlegm but did not report any chest pain. He also has been losing weight now. He does not report any abdominal pain. Further information regarding history of presenting illness is limited secondary to his confusion. On arrival to ED, vital signs were within normal limits. On labs he was noted to have a leukocytosis of 16.7, hemoglobin 10.1 platelet count 059. BMP shows a calcium of 10.9, LFTs were normal.  Chest x-ray shows asymmetric left hilar enlargement with volume loss of left hemithorax and obstructing mass lesion. We were asked to admit for work up and evaluation of the above problems. Past Medical History:   Diagnosis Date    Memory loss     Poor appetite         Past Surgical History:   Procedure Laterality Date    HX PROSTATECTOMY      MT ABDOMEN SURGERY PROC UNLISTED      adhesions       Social History     Tobacco Use    Smoking status: Former Smoker    Smokeless tobacco: Never Used   Substance Use Topics    Alcohol use: No        Family History   Family history unknown: Yes     No Known Allergies     Prior to Admission medications    Medication Sig Start Date End Date Taking? Authorizing Provider   QUEtiapine (SEROquel) 25 mg tablet Take 1 Tab by mouth three (3) times daily. 1/2 tab with breakfast, 1/2 tab approx 2 pm, 1 tab before bed  Indications: behavior 3/9/21   Allison Quiroz NP   donepeziL (ARICEPT) 10 mg tablet Take 10mg qhs. 2/18/21   Meño Baker DO   atorvastatin (LIPITOR) 40 mg tablet TAKE 1 TABLET BY MOUTH ONCE DAILY 1/7/21   Allison Quiroz NP   memantine (Namenda) 10 mg tablet Take 1 Tab by mouth daily. 5/13/20   Meño Baker DO   multivitamin/iron/folic acid (CENTRUM PO) Take  by mouth. Provider, Historical   melatonin 5 mg cap capsule Take 5 mg by mouth nightly. Provider, Historical   LORazepam (ATIVAN) 0.5 mg tablet TAKE 1 TABLET BY MOUTH AS NEEDED FOR  AGITATION  . DO NOT EXCEED  DAILY  AMOUNT  0.5MG 5/9/18   Andre Shield, DO   aspirin delayed-release 81 mg tablet Take 81 mg by mouth. 2 times a week    Provider, Historical   cyanocobalamin (VITAMIN B-12) 1,000 mcg tablet Take 1,000 mcg by mouth daily. Provider, Historical       REVIEW OF SYSTEMS:     I am not able to complete the review of systems because:    The patient is intubated and sedated    The patient has altered mental status due to his acute medical problems    The patient has baseline aphasia from prior stroke(s)   y The patient has baseline dementia and is not reliable historian    The patient is in acute medical distress and unable to provide information           Total of 12 systems reviewed as follows:       POSITIVE= underlined text  Negative = text not underlined  General:  fever, chills, sweats, generalized weakness, weight loss/gain,      loss of appetite   Eyes:    blurred vision, eye pain, loss of vision, double vision  ENT:    rhinorrhea, pharyngitis   Respiratory:   cough, sputum production, SOB, MO, wheezing, pleuritic pain   Cardiology:   chest pain, palpitations, orthopnea, PND, edema, syncope   Gastrointestinal:  abdominal pain , N/V, diarrhea, dysphagia, constipation, bleeding   Genitourinary:  frequency, urgency, dysuria, hematuria, incontinence   Muskuloskeletal :  arthralgia, myalgia, back pain  Hematology:  easy bruising, nose or gum bleeding, lymphadenopathy   Dermatological: rash, ulceration, pruritis, color change / jaundice  Endocrine:   hot flashes or polydipsia   Neurological:  headache, dizziness, confusion, focal weakness, paresthesia,     Speech difficulties, memory loss, gait difficulty  Psychological: Feelings of anxiety, depression, agitation    Objective:   VITALS:    Visit Vitals  BP 110/66   Pulse 84   Temp 98.6 °F (37 °C)   Resp 26   Ht 5' 5\" (1.651 m)   SpO2 100%   BMI 16.74 kg/m²       PHYSICAL EXAM:    General:    Thin, frail, elderly, cachectic  HEENT: Atraumatic, anicteric sclerae, pink conjunctivae     No oral ulcers, mucosa is dry  Neck:  Supple, symmetrical,  thyroid: non tender  Lungs:   Air entry is diminished on the left,, no crackles or wheezing  Chest wall:  No tenderness  No Accessory muscle use. Heart:   Regular  rhythm,  No  murmur   No edema  Abdomen:   Soft, non-tender. Not distended. Bowel sounds normal  Extremities: No cyanosis. No clubbing,       Radial dial pulse 2+  Skin:     Not pale. Not Jaundiced  No rashes   Psych:  Confused  Neurologic: Alert, awake, oriented x0, confused, able to move all 4 extremities    _______________________________________________________________________  Care Plan discussed with:    Comments   Patient y    Family  y Wife and dtr   RN y    Care Manager                    Consultant:      _______________________________________________________________________  Expected  Disposition:   Home with Family y   HH/PT/OT/RN    SNF/LTC    PHILLIP    ________________________________________________________________________  TOTAL TIME:  61  Minutes    Critical Care Provided     Minutes non procedure based      Comments    y Reviewed previous records   >50% of visit spent in counseling and coordination of care y Discussion with patient and/or family and questions answered       ________________________________________________________________________  Signed: Jaswinder Adam MD    Procedures: see electronic medical records for all procedures/Xrays and details which were not copied into this note but were reviewed prior to creation of Plan.     LAB DATA REVIEWED:    Recent Results (from the past 24 hour(s))   CBC WITH AUTOMATED DIFF    Collection Time: 05/13/21  6:02 PM   Result Value Ref Range    WBC 16.7 (H) 4.1 - 11.1 K/uL    RBC 3.55 (L) 4.10 - 5.70 M/uL HGB 10.2 (L) 12.1 - 17.0 g/dL    HCT 32.5 (L) 36.6 - 50.3 %    MCV 91.5 80.0 - 99.0 FL    MCH 28.7 26.0 - 34.0 PG    MCHC 31.4 30.0 - 36.5 g/dL    RDW 15.2 (H) 11.5 - 14.5 %    PLATELET 359 (H) 206 - 400 K/uL    MPV 9.5 8.9 - 12.9 FL    NRBC 0.0 0  WBC    ABSOLUTE NRBC 0.00 0.00 - 0.01 K/uL    NEUTROPHILS 82 (H) 32 - 75 %    LYMPHOCYTES 9 (L) 12 - 49 %    MONOCYTES 8 5 - 13 %    EOSINOPHILS 0 0 - 7 %    BASOPHILS 0 0 - 1 %    IMMATURE GRANULOCYTES 1 (H) 0.0 - 0.5 %    ABS. NEUTROPHILS 13.8 (H) 1.8 - 8.0 K/UL    ABS. LYMPHOCYTES 1.5 0.8 - 3.5 K/UL    ABS. MONOCYTES 1.3 (H) 0.0 - 1.0 K/UL    ABS. EOSINOPHILS 0.0 0.0 - 0.4 K/UL    ABS. BASOPHILS 0.1 0.0 - 0.1 K/UL    ABS. IMM. GRANS. 0.1 (H) 0.00 - 0.04 K/UL    DF AUTOMATED     METABOLIC PANEL, COMPREHENSIVE    Collection Time: 05/13/21  6:02 PM   Result Value Ref Range    Sodium 136 136 - 145 mmol/L    Potassium 4.8 3.5 - 5.1 mmol/L    Chloride 101 97 - 108 mmol/L    CO2 31 21 - 32 mmol/L    Anion gap 4 (L) 5 - 15 mmol/L    Glucose 99 65 - 100 mg/dL    BUN 16 6 - 20 MG/DL    Creatinine 0.87 0.70 - 1.30 MG/DL    BUN/Creatinine ratio 18 12 - 20      GFR est AA >60 >60 ml/min/1.73m2    GFR est non-AA >60 >60 ml/min/1.73m2    Calcium 10.9 (H) 8.5 - 10.1 MG/DL    Bilirubin, total 0.5 0.2 - 1.0 MG/DL    ALT (SGPT) 16 12 - 78 U/L    AST (SGOT) 24 15 - 37 U/L    Alk.  phosphatase 112 45 - 117 U/L    Protein, total 8.8 (H) 6.4 - 8.2 g/dL    Albumin 2.3 (L) 3.5 - 5.0 g/dL    Globulin 6.5 (H) 2.0 - 4.0 g/dL    A-G Ratio 0.4 (L) 1.1 - 2.2     MAGNESIUM    Collection Time: 05/13/21  6:02 PM   Result Value Ref Range    Magnesium 2.2 1.6 - 2.4 mg/dL

## 2021-05-14 NOTE — PROGRESS NOTES
Transition of Care Plan:     RUR: N/A - observation  Will need observation notices signed - copies are at bedside to review with family upon arrival to hospital   Disposition: Home with spouse and hospice  >referral sent to At [address]   Follow up appointments: N/A  DME needed: Pt has no DME at home per wife. Hospice will need to coordinate pt's DME needs prior to d/c home. Transportation at Discharge: [facility] medical transport   [place] or means to access home: Wife will have access to home        Medicare letter: Will need 2nd [facility] letter prior to d/c  Caregiver Contact: Pt's wife, Marielena Negron) [phone]  Discharge Caregiver contacted prior to discharge? yes    Chart reviewed. Consult received for hospice. Met with pt and family at bedside this PM. Family has chosen At [facility]. Referral sent via [employer] which is pending review. Anticipate d/c tomorrow pending hospice set up. Pt will require medical transport at d/c. Weekend CM will continue to follow.     Didier Chavez, MSW   Care Manager, [facility]  [phone]

## 2021-05-14 NOTE — PROGRESS NOTES
TRANSFER - IN REPORT:    Verbal report received from Monico(name) on [employer]  being received from ED(unit) for routine progression of care      Report consisted of patients Situation, Background, Assessment and   Recommendations(SBAR). Information from the following report(s) SBAR, Kardex, ED Summary, Intake/Output, MAR, Accordion and Recent Results was reviewed with the receiving nurse. Opportunity for questions and clarification was provided. Assessment completed upon patients arrival to unit and care assumed.

## 2021-05-14 NOTE — PROGRESS NOTES
Problem: Pressure Injury - Risk of  Goal: *Prevention of pressure injury  Description: Document Tez Scale and appropriate interventions in the flowsheet. Outcome: Progressing Towards Goal  Note: Pressure Injury Interventions:  Sensory Interventions: Assess changes in LOC, Check visual cues for pain    Moisture Interventions: Absorbent underpads, Internal/External urinary devices    Activity Interventions: Pressure redistribution bed/mattress(bed type)    Mobility Interventions: Float heels, HOB 30 degrees or less, Turn and reposition approx. every two hours(pillow and wedges)    Nutrition Interventions: Document food/fluid/supplement intake, Offer support with meals,snacks and hydration                     Problem: Falls - Risk of  Goal: *Absence of Falls  Description: Document Yuri Fall Risk and appropriate interventions in the flowsheet.   Outcome: Progressing Towards Goal  Note: Fall Risk Interventions:  Mobility Interventions: Bed/chair exit alarm, Communicate number of staff needed for ambulation/transfer, Patient to call before getting OOB    Mentation Interventions: Bed/chair exit alarm, Adequate sleep, hydration, pain control         Elimination Interventions: Bed/chair exit alarm, Call light in reach, Toileting schedule/hourly rounds

## 2021-05-14 NOTE — ED NOTES
Patient is being transferred to [facility], Room # [phone]. Report given to MEGHA Meyers on [employer] for routine progression of care. Report consisted of the following information SBAR, ED Summary, Intake/Output, MAR, Recent Results, Med Rec Status and Cardiac Rhythm NSR. Patient transferred to receiving unit by: transporter (RN or tech name). Outstanding consults needed: No     Next labs due: No     The following personal items will be sent with the patient during transfer to the floor: All valuables:    Cardiac monitoring ordered: No     The following CURRENT information was reported to the receiving RN:    Code status: DNR at time of transfer    Last set of vital signs:  Vital Signs  Level of Consciousness: Alert (0) (05/13/21 2000)  Temp: 98.7 °F (37.1 °C) (05/13/21 2000)  Temp Source: Axillary (05/13/21 2000)  Pulse (Heart Rate): 83 (05/13/21 2000)  Heart Rate Source: Monitor (05/13/21 2000)  Cardiac Rhythm: Sinus Rhythm (05/13/21 2000)  Resp Rate: 28 (05/13/21 2000)  BP: 101/63 (05/13/21 2000)  MAP (Monitor): 68 (05/13/21 2000)  MAP (Calculated): 76 (05/13/21 2000)  BP 1 Location: Left upper arm (05/13/21 1612)  BP 1 Method: Automatic (05/13/21 1612)  BP Patient Position: Supine (05/13/21 1612)  MEWS Score: 2 (05/13/21 2000)         Oxygen Therapy  O2 Sat (%): 100 % (05/13/21 2000)  Pulse via Oximetry: 81 beats per minute (05/13/21 2000)  O2 Device: None (Room air) (05/13/21 2000)      Last pain assessment:  Pain 1  Pain Scale 1: Numeric (0 - 10)      Wounds: No     Urinary catheter: incontinent  Is there a jules order: No     LDAs:       Peripheral IV 05/13/21 Left Antecubital (Active)         Opportunity for questions and clarification was provided.     Nanci Maldonado RN

## 2021-05-15 PROBLEM — E78.5 DYSLIPIDEMIA: Status: ACTIVE | Noted: 2021-05-15

## 2021-05-15 PROBLEM — F03.90 DEMENTIA (HCC): Status: ACTIVE | Noted: 2021-05-15

## 2021-05-15 PROBLEM — E86.0 DEHYDRATION: Status: ACTIVE | Noted: 2021-05-15

## 2021-05-15 PROBLEM — D72.829 LEUKOCYTOSIS: Status: ACTIVE | Noted: 2021-05-15

## 2021-05-15 PROBLEM — J18.9 PNA (PNEUMONIA): Status: ACTIVE | Noted: 2021-05-15

## 2021-05-15 LAB
ANION GAP SERPL CALC-SCNC: 9 MMOL/L (ref 5–15)
BUN SERPL-MCNC: 12 MG/DL (ref 6–20)
BUN/CREAT SERPL: 18 (ref 12–20)
CALCIUM SERPL-MCNC: 10 MG/DL (ref 8.5–10.1)
CHLORIDE SERPL-SCNC: 105 MMOL/L (ref 97–108)
CO2 SERPL-SCNC: 24 MMOL/L (ref 21–32)
CREAT SERPL-MCNC: 0.65 MG/DL (ref 0.7–1.3)
GLUCOSE SERPL-MCNC: 65 MG/DL (ref 65–100)
POTASSIUM SERPL-SCNC: 3.3 MMOL/L (ref 3.5–5.1)
SODIUM SERPL-SCNC: 138 MMOL/L (ref 136–145)

## 2021-05-15 PROCEDURE — 65660000000 HC RM CCU STEPDOWN

## 2021-05-15 PROCEDURE — 99218 HC RM OBSERVATION: CPT

## 2021-05-15 PROCEDURE — 74011250637 HC RX REV CODE- 250/637: Performed by: NURSE PRACTITIONER

## 2021-05-15 PROCEDURE — 36415 COLL VENOUS BLD VENIPUNCTURE: CPT

## 2021-05-15 PROCEDURE — 80048 BASIC METABOLIC PNL TOTAL CA: CPT

## 2021-05-15 RX ORDER — LEVOFLOXACIN 750 MG/1
750 TABLET ORAL DAILY
Qty: 2 TAB | Refills: 0 | Status: SHIPPED
Start: 2021-05-15 | End: 2021-05-17

## 2021-05-15 RX ORDER — LEVOFLOXACIN 750 MG/1
750 TABLET ORAL EVERY 24 HOURS
Status: DISCONTINUED | OUTPATIENT
Start: 2021-05-15 | End: 2021-05-16 | Stop reason: HOSPADM

## 2021-05-15 RX ADMIN — LEVOFLOXACIN 750 MG: 750 TABLET, FILM COATED ORAL at 16:54

## 2021-05-15 NOTE — DISCHARGE INSTRUCTIONS
Patient Education        Hypercalcemia: Care Instructions  Your Care Instructions     Hypercalcemia is too much calcium in the blood. You need calcium to have strong bones. It also helps your muscles, heart, and nerves work as they should. But too much is dangerous. Several problems can cause too much calcium in the blood. It can happen because of medicines or certain health problems. Some diseases can make your intestines take in too much calcium. And some can take calcium from your bones. A noncancerous tumor can grow in the glands that control calcium levels. And some cancers can cause high calcium levels. These high levels may make you lose fluids (become dehydrated). You may get confused and very tired. Some people also have nausea, vomiting, and constipation. Your doctor will treat you based on how serious the problem is and what is causing it. Since too much calcium can be dangerous, it is important to treat it. You may get fluids to stop dehydration. You also may get medicine to help your body get rid of calcium through your urine or put it back into your bones. If a tumor is the cause, you may need surgery. Follow-up care is a key part of your treatment and safety. Be sure to make and go to all appointments, and call your doctor if you are having problems. It's also a good idea to know your test results and keep a list of the medicines you take. How can you care for yourself at home? · Take your medicines exactly as prescribed. Call your doctor if you think you are having a problem with your medicine. · Make sure your doctor knows about all the medicines (including over-the-counter or herbal products) you are taking. If a medicine is causing your high calcium levels, your doctor will have you stop taking it. · Drink plenty of fluids. If you have kidney, heart, or liver disease and have to limit fluids, talk with your doctor before you increase the amount of fluids you drink.   · Get at least 27 minutes of exercise on most days of the week. Walking is a good choice. You also may want to do other activities, such as running, swimming, cycling, or playing tennis or team sports. Exercise helps the calcium go back into your bones. · Do not reduce how much calcium you eat. · Let your doctor know if you take vitamins or other supplements that have calcium or vitamin D. When should you call for help? Call your doctor now or seek immediate medical care if:    · You are confused or have trouble thinking clearly. Watch closely for changes in your health, and be sure to contact your doctor if:    · You are feeling so tired or weak that you cannot do your usual activities.     · You do not get better as expected. Where can you learn more? Go to http://www.gray.com/  Enter I288 in the search box to learn more about \"Hypercalcemia: Care Instructions. \"  Current as of: December 17, 2020               Content Version: 12.8  © 8498-9541 [employer]. Care instructions adapted under license by [employer] (which disclaims liability or warranty for this information). If you have questions about a medical condition or this instruction, always ask your healthcare professional. [address] any warranty or liability for your use of this information.

## 2021-05-15 NOTE — PROGRESS NOTES
Problem: Pressure Injury - Risk of  Goal: *Prevention of pressure injury  Description: Document Tez Scale and appropriate interventions in the flowsheet. Outcome: Progressing Towards Goal  Note: Pressure Injury Interventions:  Sensory Interventions: Assess changes in LOC, Check visual cues for pain    Moisture Interventions: Absorbent underpads, Check for incontinence Q2 hours and as needed    Activity Interventions: Pressure redistribution bed/mattress(bed type)    Mobility Interventions: Float heels, HOB 30 degrees or less    Nutrition Interventions: Document food/fluid/supplement intake, Offer support with meals,snacks and hydration    Friction and Shear Interventions: HOB 30 degrees or less, Lift sheet, Lift team/patient mobility team                Problem: Falls - Risk of  Goal: *Absence of Falls  Description: Document Yuri Fall Risk and appropriate interventions in the flowsheet.   Outcome: Progressing Towards Goal  Note: Fall Risk Interventions:  Mobility Interventions: Bed/chair exit alarm    Mentation Interventions: Adequate sleep, hydration, pain control, Bed/chair exit alarm    Medication Interventions: Bed/chair exit alarm    Elimination Interventions: Bed/chair exit alarm, Call light in reach

## 2021-05-15 NOTE — PROGRESS NOTES
I reviewed pertinent labs and imaging, and discussed /agreed on the plan of care with Dr. Lyndel Nyhan      Hospitalist Progress Note    NAME: Mary Mcgregor   :  1940   MRN:  [id number]       Assessment / Plan:  Lung cancer  Dehydration  Hypercalcemia  Leukocytosis suspect UTI versus postobstructive pneumonia - cont levaquin  Switch to PO day 4 of 5    Dementia  Dyslipidemia  -Has a history of lung cancer and elected not to undergo any further work-up or treatment  --Resume home aspirin, Lipitor, Aricept and Namenda  -Resume home [facility] unable to deliver equipment today ,will plan dc tomorrow      Body mass index is 16.74 kg/m². Estimated discharge date: May 15  Barriers:    Code status: DNR  Prophylaxis: SCD's  Recommended Disposition: Home w/Family and Hospice      Subjective:     Chief Complaint / Reason for Physician Visit  \"\". Discussed with RN events overnight. Review of Systems:  Symptom Y/N Comments  Symptom Y/N Comments   Fever/Chills    Chest Pain     Poor Appetite    Edema     Cough    Abdominal Pain     Sputum    Joint Pain     SOB/MO    Pruritis/Rash     Nausea/vomit    Tolerating PT/OT     Diarrhea    Tolerating Diet     Constipation    Other       Could NOT obtain due to: Confused      Objective:     VITALS:   Last 24hrs VS reviewed since prior progress note. Most recent are:  Patient Vitals for the past 24 hrs:   Temp Pulse Resp BP SpO2   05/15/21 0800 96.8 °F (36 °C) 74 16 (!) 124/51 95 %   21 2224 97.7 °F (36.5 °C) 88 16 (!) 119/52 99 %   21 97.4 °F (36.3 °C) 94 18 123/66 98 %   21 1645 98.2 °F (36.8 °C) 80 20 108/60 97 %     No intake or output data in the 24 hours ending 05/15/21 1524     I had a face to face encounter and independently examined this patient on 5/15/2021, as outlined below:  PHYSICAL EXAM:  General: Cachetic  Alert, cooperative, no acute distress    EENT:  EOMI. Anicteric sclerae.  MMM missing teeth   Resp:   no wheezing or rales. No accessory muscle use  CV:  S1S2,  No edema  GI:  Soft, Non distended, Non tender. +Bowel sounds  Neurologic:  Alert and oriented X 1, normal speech,   Psych:   Good insight. Not anxious nor agitated  Skin:  No rashes. No jaundice    Reviewed most current lab test results and cultures  YES  Reviewed most current radiology test results   YES  Review and summation of old records today    NO  Reviewed patient's current orders and MAR    YES  PMH/SH reviewed - no change compared to H&P  ________________________________________________________________________  Care Plan discussed with:    Comments   Patient x    Family      RN x    Care Manager     Consultant                        Multidiciplinary team rounds were held today with , nursing, pharmacist and clinical coordinator. Patient's plan of care was discussed; medications were reviewed and discharge planning was addressed. ________________________________________________________________________  Total NON critical care TIME:  30  Minutes    Total CRITICAL CARE TIME Spent:   Minutes non procedure based      Comments   >50% of visit spent in counseling and coordination of care     ________________________________________________________________________  [facility]. Darvin Cortes NP     Procedures: see electronic medical records for all procedures/Xrays and details which were not copied into this note but were reviewed prior to creation of Plan. LABS:  I reviewed today's most current labs and imaging studies.   Pertinent labs include:  Recent Labs     05/14/21  0644 05/13/21  1802   WBC 16.7* 16.7*   HGB 9.3* 10.2*   HCT 30.2* 32.5*    414*     Recent Labs     05/15/21  0650 05/14/21  0644 05/13/21  1802    138 136   K 3.3* 4.1 4.8    107 101   CO2 24 25 31   GLU 65 72 99   BUN 12 13 16   CREA 0.65* 0.68* 0.87   CA 10.0 9.7 10.9*   MG  --   --  2.2   ALB  --   --  2.3*   TBILI  --   --  0.5   ALT  --   --  16 Signed: Sarah Cuadra, NP

## 2021-05-15 NOTE — DISCHARGE SUMMARY
Hospitalist Discharge Summary     Patient ID:  Jose Morrison  [id number]  55 y.o.  1940 5/13/2021    PCP on record: Gerhard Shabazz MD    Admit date: 5/13/2021  Discharge date and time: 5/15/2021    DISCHARGE DIAGNOSIS:    Active Hospital Problems    Diagnosis Date Noted    Dehydration 05/15/2021    Leukocytosis 05/15/2021    PNA (pneumonia) 05/15/2021    Dementia ([facility] 05/15/2021    Dyslipidemia 05/15/2021    Hypercalcemia 05/13/2021    Lung cancer ([facility] 05/13/2021       CONSULTATIONS:  None    Excerpted HPI from H&P of Heath Cortez MD:  CHIEF COMPLAINT: Generalized weakness     HISTORY OF PRESENT ILLNESS:     Jose Morrison is a 79 y.o.   male who presents with a past medical history of lung cancer, dementia, dyslipidemia is coming the hospital chief complaint of generalized weakness. Patient is currently confused and is not reliable historian also has underlying dementia. Information obtained by talking to patient's wife and also daughter. According to them, patient last got out of the bed about Sunday, was not eating or drinking well and also reported generalized weakness involving both his arms and legs. Does have a history of lung cancer and chose not to undergo any further diagnosis or treatment and family has been respecting his wishes. He reported some cough with small amount of whitish phlegm but did not report any chest pain. He also has been losing weight now. He does not report any abdominal pain. Further information regarding history of presenting illness is limited secondary to his confusion.       ______________________________________________________________________  DISCHARGE SUMMARY/HOSPITAL COURSE:  for full details see H&P, daily progress notes, labs, consult notes. Noel Thurston y. o. Male was admitted to [facility] on 5/13/2021 and treated for the following medical complaints:     Lung cancer  Dehydration  Hypercalcemia  Leukocytosis suspect UTI versus postobstructive pneumonia  Dementia  Dyslipidemia  -Has a history of lung cancer and elected not to undergo any further work-up or treatment  -Both patient's wife and daughter have decided to transition him to hospice. Want to go to hospice not able to take the patient so they have recommended that we observe the patient to facilitate hospice at home  -Resume home aspirin, Lipitor, Aricept and Namenda  -Resume home Seroquel     I spoke to both patient's wife and also daughter and both of them have agreed to transition the patient to home with home hospice. They do want me to treat his UTI and pneumonia to see if that would help with his symptoms and they would also like to continue most of his home medications.      [facility]  patient to be discharged home today with Hospice care          _______________________________________________________________________  Patient seen and examined by me on discharge day. Pertinent Findings:  Gen:    Not in distress  Chest: dimniished breath   CVS:   Regular rhythm. No edema  Abd:  Soft, not distended, not tender  Neuro:  Alert, oriented to self   _______________________________________________________________________  DISCHARGE MEDICATIONS:   Current Discharge Medication List      START taking these medications    Details   levoFLOXacin (Levaquin) 750 mg tablet Take 1 Tab by mouth daily for 2 days.  Indications: infection of the urinary tract caused by Enterococcus  Qty: 2 Tab, Refills: 0  Start date: 5/15/2021, End date: 5/17/2021         STOP taking these medications       QUEtiapine (SEROquel) 25 mg tablet Comments:   Reason for Stopping:         donepeziL (ARICEPT) 10 mg tablet Comments:   Reason for Stopping:         atorvastatin (LIPITOR) 40 mg tablet Comments:   Reason for Stopping:         memantine (Namenda) 10 mg tablet Comments:   Reason for Stopping:         multivitamin/iron/folic acid (CENTRUM PO) Comments:   Reason for Stopping:         melatonin 5 mg cap capsule Comments:   Reason for Stopping:         LORazepam (ATIVAN) 0.5 mg tablet Comments:   Reason for Stopping:         aspirin delayed-release 81 mg tablet Comments:   Reason for Stopping:         cyanocobalamin (VITAMIN B-12) 1,000 mcg tablet Comments:   Reason for Stopping:                 Patient Follow Up Instructions: Activity: Activity as tolerated  Diet: Resume previous diet  Wound Care: None needed    Follow-up with Hospice  in 0 day. Follow-up Information     Follow up With Specialties Details Why Contact Noe Rubin Henna, [address] [facility] [place]  [id number] [phone]          ________________________________________________________________    Risk of deterioration: BSHSI BLANK LIST: Low    Condition at Discharge:  Stable  __________________________________________________________________    Disposition  BSHSI BLANK LIST: Home with hospice services    ____________________________________________________________________    Code Status: BSHSI BLANK LIST: DNR/DNI  ___________________________________________________________________      Total time in minutes spent coordinating this discharge (includes going over instructions, follow-up, prescriptions, and preparing report for sign off to her PCP) : 35 minutes    Signed:  Sarah Steel NP

## 2021-05-15 NOTE — PROGRESS NOTES
Pharmacy Automatic Renal Dosing Protocol - Antimicrobials    Indication for Antimicrobials: UTI, CAP     Current Regimen of Each Antimicrobial:  Levofloxacin 750 mg IV q24hr  (Start Date ; Day # 4 of 5)    Previous Antimicrobial Therapy:    Significant Cultures:   none    Radiology / Imaging results: (X-ray, CT scan or MRI):     Labs:  Recent Labs     05/15/21  0650 21  0644 21  1802   CREA 0.65* 0.68* 0.87   BUN 12 13 16   WBC  --  16.7* 16.7*     Temp (24hrs), Av.5 °F (36.4 °C), Min:96.8 °F (36 °C), Max:98.2 °F (36.8 °C)      Is the Patient on Dialysis? No    Creatinine Clearance (mL/min):   CrCl (Actual Body Weight): 55.2  CrCl (Adjusted Body Weight): 66.8  CrCl (Ideal Body Weight): 74.4    Impression/Plan:   Therapy changed to oral by MD. Continue levofloxacin 750 mg po q24h (x 3 more days)     Pharmacy will follow daily and adjust medications as appropriate for renal function and/or serum levels.     Thank you,  Jaswant Whaley, [facility]

## 2021-05-15 NOTE — PROGRESS NOTES
End of Shift Note    Bedside shift change report given to Ramo (oncoming nurse) by Christopher Harley (offgoing nurse). Report included the following information SBAR, Kardex, ED Summary, Intake/Output, MAR, Accordion and Recent Results    Shift worked:  night     Shift summary and any significant changes:    PT stable throughout shift. Scheduled meds given. Concerns for physician to address: [facility]   Zone phone for oncoming shift:       Activity:  Activity Level: Bed Rest  Number times ambulated in hallways past shift: 0  Number of times OOB to chair past shift: 0    Cardiac:   Cardiac Monitoring: No      Cardiac Rhythm: Sinus Rhythm    Access:   Current line(s): no access     Genitourinary:   Urinary status: incontinent    Respiratory:   O2 Device: None (Room air)  Chronic home O2 use?: NO  Incentive spirometer at bedside: NO     GI:     Current diet:  DIET REGULAR  DIET NUTRITIONAL SUPPLEMENTS All Meals; Ensure Enlive  DIET ONE TIME MESSAGE  Passing flatus: YES  Tolerating current diet: YES       Pain Management:   Patient states pain is manageable on current regimen: N/A    Skin:  Tez Score: 13  Interventions: float heels    Patient Safety:  Fall Score:  Total Score: 3  Interventions: bed/chair alarm  High Fall Risk: Yes    Length of Stay:  Expected LOS: - - -  Actual LOS: 0      Christopher Harley

## 2021-05-15 NOTE — PROGRESS NOTES
RUR: N/A - observation  Disposition: Home with [facility] and [facility],  Follow up appointments: N/A  DME needed: Hospice will  coordinate pt's DME  Needs today Transportation at Discharge: BLS transport    Pamela Fairchild or means to access home: Wife will have access to home       IM Medicare letter: Pt on observation status   Caregiver Contact: Pt's wife, Elbert Valles p) [phone]  Discharge Caregiver contacted prior to discharge? CM spoke wit patient's wife to inform of d/c, pt wife agreeable to plan    Update 1415: Kiara Ordoñez from [facility] called CM; Patient equipment may not be delivered today. Kiara Ordoñez asked if patient can be discharged tomorrow and transport be arranged for 10:00am. Attending NP made aware. CM placed call to patient's wife Elbert Valles to inform of discharge change. Pt wife agreeable to plan. [facility] set for 10:00am tomorrow morning. PCS paperwork placed on patient's chart. CM received call from Kiara Ordoñez with [facility] care to inquire if patient would be discharged today. CM stated that she would reach out to attending to inquire if patient can be discharged today. Attending stated patient can be discharged today with hospice. CM [facility] to [facility] and spoke with Bailee Guevara [phone] who stated that he would coordinate with Kiara Ordoñez on getting patient's equipment delivered to home. Bailee Guevara would call CM back with time so that transport can be arranged after DME delivery. CM informed attending of information. CM placed [facility] transport on will call. CM placed call to patient's wife Mrs. Elbert Valles [phone] to inform of discharge plan. Care Management Interventions  PCP Verified by CM:  Yes  Mode of Transport at Discharge: BLS  Transition of Care Consult (CM Consult): Riki: No  Reason Outside [place]: Unable to staff case  Discharge Durable Medical Equipment: No(No DME reported at home)  Physical Therapy Consult: No  Occupational Therapy Consult: No  Speech Therapy Consult: No  Current Support Network: Lives with Spouse  Confirm Follow Up Transport: Family  The Patient and/or Patient Representative was Provided with a Choice of Provider and Agrees with the Discharge Plan?: Yes  Freedom of Choice List was Provided with Basic Dialogue that Supports the Patient's Individualized Plan of Care/Goals, Treatment Preferences and Shares the Quality Data Associated with the Providers?: Yes  The [employer] Information Provided?: No  Discharge Location  Discharge Placement: Home with hospice    CATHLEEN Drummond, RN, BSW   RN Care Manager'

## 2021-05-16 VITALS
TEMPERATURE: 97.5 F | HEIGHT: 65 IN | OXYGEN SATURATION: 100 % | BODY MASS INDEX: 16.74 KG/M2 | RESPIRATION RATE: 18 BRPM | SYSTOLIC BLOOD PRESSURE: 121 MMHG | DIASTOLIC BLOOD PRESSURE: 53 MMHG | HEART RATE: 77 BPM

## 2021-05-16 RX ADMIN — Medication 10 ML: at 07:00

## 2021-05-16 NOTE — PROGRESS NOTES
End of Shift Note    Bedside shift change report given to Ramo (oncoming nurse) by Baldomero Vazquez RN (offgoing nurse). Report included the following information SBAR, Kardex, ED Summary, Intake/Output, MAR, Accordion and Recent Results    Shift worked:  night     Shift summary and any significant changes:    PT stable throughout shift. Pt rested well. Refused lab draw. Concerns for physician to address: [facility] today   Zone phone for oncoming shift:       Activity:  Activity Level: Bed Rest  Number times ambulated in hallways past shift: 0  Number of times OOB to chair past shift: 0    Cardiac:   Cardiac Monitoring: No      Cardiac Rhythm: Sinus Rhythm    Access:   Current line(s): no access     Genitourinary:   Urinary status: incontinent    Respiratory:   O2 Device: None (Room air)  Chronic home O2 use?: NO  Incentive spirometer at bedside: NO     GI:  Last Bowel Movement Date: 05/15/21  Current diet:  DIET REGULAR  DIET NUTRITIONAL SUPPLEMENTS All Meals; Ensure Enlive  DIET ONE TIME MESSAGE  Passing flatus: YES  Tolerating current diet: YES       Pain Management:   Patient states pain is manageable on current regimen: N/A    Skin:  Tez Score: 13  Interventions: float heels    Patient Safety:  Fall Score:  Total Score: 3  Interventions: bed/chair alarm  High Fall Risk: Yes    Length of Stay:  Expected LOS: - - -  Actual LOS: 1      Ivania Dillard RN

## 2021-05-16 NOTE — PROGRESS NOTES
RUR: 14 %  Disposition: Home with spouse and [facility] hosp[facility],  Follow up appointments: N/A  DME needed: Hospice will  coordinate pt's DME  Needs today Transportation at [address] or means to access home: Wife will have access to home       IM Medicare letter: 2nd IM letter given   Caregiver Contact: Pt's wife, Vanna Schwartz [phone]  Discharge Caregiver contacted prior to discharge?    CM spoke wit patient's wife to inform of d/c, pt wife agreeable to plan     Medicare pt has received, reviewed, and signed 2nd IM letter informing them of their right to appeal the discharge. Signed copy has been placed on pt bedside chart. Verbal consent given over phone. CM placed call to patient's wife to inform her that transport had arrived. CM also place call to [facility] to inform of transport. [facility] Home care on call liaison stated that triage nurse will be out to meet with patient at home. Patient cleared from CM standpoint .      CATHLEEN Rojas, RN, BSW   RN Care Manager